# Patient Record
Sex: FEMALE | Employment: OTHER | URBAN - METROPOLITAN AREA
[De-identification: names, ages, dates, MRNs, and addresses within clinical notes are randomized per-mention and may not be internally consistent; named-entity substitution may affect disease eponyms.]

---

## 2017-01-18 ENCOUNTER — OFFICE VISIT (OUTPATIENT)
Dept: HEMATOLOGY | Age: 57
End: 2017-01-18

## 2017-01-18 VITALS
RESPIRATION RATE: 16 BRPM | OXYGEN SATURATION: 98 % | HEART RATE: 91 BPM | SYSTOLIC BLOOD PRESSURE: 142 MMHG | TEMPERATURE: 98.5 F | BODY MASS INDEX: 27.13 KG/M2 | DIASTOLIC BLOOD PRESSURE: 81 MMHG | WEIGHT: 179 LBS | HEIGHT: 68 IN

## 2017-01-18 DIAGNOSIS — K75.81 NONALCOHOLIC STEATOHEPATITIS (NASH): Primary | ICD-10-CM

## 2017-01-18 NOTE — PROGRESS NOTES
93 [facility], TAYLER Donato PA-C Winton Abraham, MD, [address], MD Rissa Onofre NP Curly Kast, NP        [address], [address] [facility], [address]     [phone]     FAX: [address] [place] [address], [address] [place] [address], [address]     [phone]     FAX: [phone]       Patient Care Team:  Jose Hernandez MD as PCP - General (Internal Medicine)      Problem List  Date Reviewed: 12/31/2016          Codes Class Noted    Borderline high serum cholesterol ICD-10-CM: E78.9  ICD-9-CM: 272.9  12/14/2016        Menopausal and postmenopausal disorder ICD-10-CM: N95.9  ICD-9-CM: 627.9  12/14/2016        Nonalcoholic steatohepatitis (CARMONA) ICD-10-CM: K75.81  ICD-9-CM: 571.8  12/14/2016    Overview Signed 12/14/2016  2:24 PM by Maria T Billy     Overview:   Dr. Oz Lindquist. biopsy. Pelvic pain ICD-10-CM: R10.2  ICD-9-CM: [id number]  12/14/2016        Sensation of pressure in bladder area ICD-10-CM: R39.89  ICD-9-CM: 596.89  12/14/2016        Urinary frequency ICD-10-CM: R35.0  ICD-9-CM: 788.41  12/14/2016        Incomplete bladder emptying ICD-10-CM: R33.9  ICD-9-CM: 788.21  12/14/2016        Microscopic hematuria ICD-10-CM: R31.29  ICD-9-CM: 599.72  12/8/2016        Hypercholesterolemia ICD-10-CM: E78.00  ICD-9-CM: 272.0  8/18/2016        S/P appendectomy ICD-10-CM: Z90.49  ICD-9-CM: V45.89  8/18/2016              Lynn Lange returns to the [address] for management CARMONA. The active problem list, all pertinent past medical history, liver histology, medications, radiologic findings and laboratory findings related to the liver disorder were reviewed with the patient. The patient is a 49 y.o.   female who was first noted to have abnormalities in liver transaminases in 2/2016. Serologic evaluation for markers of chronic liver disease were all negative. The most recent Ultrasound of the liver was performed in 9/2016. The results of the imaging suggested chronic liver disease or fatty liver disease. Assessment of liver fibrosis was performed with sheer wave elastogrphy at THE [facility] in 9/2016. The result was 5.5 kPa which correlates with stage 1 portal fibrosis. The patient underwent a liver biopsy in 10/2016. This demonstrates CARMONA with portal fibrosis. The most recent laboratory studies indicate that the liver transaminases are elevated, ALP is normal, tests of hepatic synthetic and metabolic function are normal,     The patient has no symptoms which could be attributed to the liver disorder. The patient completes all daily activities without any functional limitations. The patient has not experienced fatigue, pain in the right side over the liver,       ALLERGIES  Allergies   Allergen Reactions    Stadol [Butorphanol Tartrate] Hives    Sulfa (Sulfonamide Antibiotics) Hives       MEDICATIONS  Current Outpatient Prescriptions   Medication Sig    ezetimibe (ZETIA) 10 mg tablet Take  by mouth.  ibuprofen (MOTRIN) 200 mg tablet 200 mg.  LORazepam (ATIVAN) 1 mg tablet TAKE 1/2 TO 1 TABLET BY MOUTH 3 TIMES AS NEEDED FOR ANXIETY     No current facility-administered medications for this visit. SYSTEM REVIEW NOT RELATED TO LIVER DISEASE OR REVIEWED ABOVE:  Constitution systems: Negative for fever, chills, weight gain, weight loss. Eyes: Negative for visual changes. ENT: Negative for sore throat, painful swallowing. Respiratory: Negative for cough, hemoptysis, SOB. Cardiology: Negative for chest pain, palpitations. GI:  Negative for constipation or diarrhea. : Negative for urinary frequency, dysuria, hematuria, nocturia. Skin: Negative for rash. Hematology: Negative for easy bruising, blood clots.     Musculo-skelatal: Right flank pain. Neurologic: Negative for headaches, dizziness, vertigo, memory problems not related to HE. Psychology: Negative for anxiety, depression. FAMILY HISTORY:  The father  of GB rupture. The mother  of CHF. There is no family history of liver disease. SOCIAL HISTORY:  The patient is . The patient has 2 children,   The patient has never used tobacco products. The patient consumes 2-3 alcoholic beverages per weekend. The patient currently works full time [employer]. PHYSICAL EXAMINATION:  Visit Vitals    /81 (BP 1 Location: Left arm, BP Patient Position: Sitting)    Pulse 91    Temp 98.5 °F (36.9 °C) (Tympanic)    Resp 16    Ht 5' 8\" (1.727 m)    Wt 179 lb (81.2 kg)    SpO2 98%    BMI 27.22 kg/m2     General: No acute distress. Eyes: Sclera anicteric. ENT: No oral lesions. Thyroid normal.  Nodes: No adenopathy. Skin: No spider angiomata. No jaundice. No palmar erythema. Respiratory: Lungs clear to auscultation. Cardiovascular: Regular heart rate. No murmurs. No JVD. Abdomen: Soft non-tender. Liver size normal to percussion/palpation. Spleen not palpable. No obvious ascites. Extremities: No edema. No muscle wasting. No gross arthritic changes. Neurologic: Alert and oriented. Cranial nerves grossly intact. No asterixis.     LABORATORY STUDIES:  [facility] Ref Rng 2016   WBC 4.6 - 13.2 K/uL 7.3   ANC 1.8 - 8.0 K/UL 3.8   HGB 12.0 - 16.0 g/dL 13.8    - 420 K/uL 229   INR 0.8 - 1.2   0.9   AST 15 - 37 U/L 34   ALT 13 - 56 U/L 65 (H)   Alk Phos 45 - 117 U/L 121 (H)   Bili, Total 0.2 - 1.0 MG/DL 0.4   Bili, Direct 0.0 - 0.2 MG/DL 0.1   Albumin 3.4 - 5.0 g/dL 4.2   BUN 7.0 - 18 MG/DL 12   Creat 0.6 - 1.3 MG/DL 0.89   Na 136 - 145 mmol/L 143   K 3.5 - 5.5 mmol/L 3.8   Cl 100 - 108 mmol/L 105   CO2 21 - 32 mmol/L 26   Glucose 74 - 99 mg/dL 85       SEROLOGIES:  Serologies Latest Ref Rng 8/18/2016   Hep A Ab, Total NEGATIVE   NEGATIVE   Hep B Surface Ag <1.00 Index <0.10   Hep B Surface Ag Interp NEG   NEGATIVE   Hep B Core Ab, Total NEGATIVE   NEGATIVE   Hep B Surface Ab >10.0 mIU/mL <3.10 (L)   Hep B Surface Ab Interp POS   NEGATIVE (A)   Hep C Ab 0.0 - 0.9 s/co ratio <0.1   Ferritin 8 - 388 NG/ML 77   Iron % Saturation % 26   JOCY, IFA  NEGATIVE   ASMCA 0 - 19 Units 15   Ceruloplasmin 19.0 - 39.0 mg/dL 26.1   Alpha-1 antitrypsin level 90 - 200 mg/dL 91     LIVER HISTOLOGY:  9/2016. Ultrasound sheer wave elastography was performed at THE [facility]. E Range: 4.60-6.55 kPa. E Mean: 5.52 kPa. E Median: 5.66 kPa. E Std: 0.62 kPa  The results suggested a fibrosis level of F1.  10/2016. Slides reviewed by MLS. CARMONA. 66-75% macro and micovesicular steatosis. Mild ballooning. Mild inflammation. Portal fibrosis. DINORA score 5 (311). ENDOSCOPIC PROCEDURES:  Not available or performed    RADIOLOGY:  4/2016. Ultrasound of liver. Echogenic consistent with chronic liver disease or fatty lvier. No liver mass lesions. No dilated bile ducts. No ascites. 9/2016. Ultrasound of liver. Echogenic consistent with chronic liver disease. No liver mass lesions. No dilated bile ducts. No ascites. OTHER TESTING:  Not available or performed    ASSESSMENT AND PLAN:  CARMONA with portal fibrosis. Liver transaminases are elevated. Alkaline phosphate is normal.  Liver function is normal.  The platelet count is normal.      The patient was counseled regarding diet and exercise to achieve weight loss. The best diet for patients with fatty liver is one very low in carbohydrates and enriched with protein such as an Noelle's program.      The patient was told to to consume any food products and drinks containing fructose as this enhances hepatic fat synthesis. The patient would be eligible for participation in clinical trial for treatment of CARMONA.   She would like to enrol in a clinical trial.  She will return to the office in 1 week for this. We will continue to monitor the patient at periodic intervals. If weight loss is successful the fat will resolve from the liver and liver enzymes should return to the normal range. We would then repeat an ultrasound to see if this also returns to normal.  If liver enzymes do not return to normal with weight reduction additional evaluation may be necessary over time. The patient was directed to continue all current medications at the current dosages. There are no contraindications for the patient to take any medications that are necessary for treatment of other medical issues including medications for diabetes mellitus and hypercholesterolemia. The patient was counseled regarding alcohol consumption and that this could contribute to fatty liver disease. Vaccination for viral hepatitis A and B is recommended since the patient has no serologic evidence of previous exposure or vaccination with immunity. All of the above issues were discussed with the patient. All questions were answered. The patient expressed a clear understanding of the above. [facility] [place] [address] in 1 week to enter a clinical trial for treatment of CARMONA.     Jean Michel MD  Liver [place] of [address] [place] [address], [address] [place] [address] Jeri Treadwell, [address]  [phone]

## 2017-01-18 NOTE — MR AVS SNAPSHOT
Visit Information Date & Time Provider Department Dept. Phone Encounter #  
 1/18/2017  3:00 PM Ned Greenwood MD Via [address] [id number] Upcoming Health Maintenance Date Due DTaP/Tdap/Td series (1 - Tdap) 11/27/1983 PAP AKA CERVICAL CYTOLOGY 11/27/1983 BREAST CANCER SCRN MAMMOGRAM 11/27/2012 FOBT Q 1 YEAR AGE 50-75 11/27/2012 INFLUENZA AGE 9 TO ADULT 8/1/2016 Allergies as of 1/18/2017  Review Complete On: 1/18/2017 By: Daly Neely Severity Noted Reaction Type Reactions Stadol [Butorphanol Tartrate]  08/18/2016    Hives Sulfa (Sulfonamide Antibiotics)  01/18/2017    Hives Current Immunizations  Never Reviewed No immunizations on file. Not reviewed this visit Vitals BP Pulse Temp Resp Height(growth percentile) 142/81 (BP 1 Location: Left arm, BP Patient Position: Sitting) 91 98.5 °F (36.9 °C) (Tympanic) 16 5' 8\" (1.727 m) Weight(growth percentile) SpO2 BMI OB Status Smoking Status 179 lb (81.2 kg) 98% 27.22 kg/m2 Menopause Never Smoker Vitals History BMI and BSA Data Body Mass Index Body Surface Area  
 27.22 kg/m 2 1.97 m 2 Preferred Pharmacy Pharmacy Name Phone [id number] [place], [address] [place] [address] AT [address] [place] [address] [phone] Your Updated Medication List  
  
   
This list is accurate as of: 1/18/17  3:38 PM.  Always use your most recent med list.  
  
  
  
  
 ibuprofen 200 mg tablet Commonly known as:  MOTRIN  
200 mg. LORazepam 1 mg tablet Commonly known as:  ATIVAN  
TAKE 1/2 TO 1 TABLET BY MOUTH 3 TIMES AS NEEDED FOR ANXIETY  
  
 ZETIA 10 mg tablet Generic drug:  ezetimibe Take  by mouth. Introducing [facility] & HEALTH SERVICES! Dear Regulo Bean: Thank you for requesting a [employer] account.   Our records indicate that you already have an active Shizzlr account. You can access your account anytime at [email or web address] Did you know that you can access your hospital and ER discharge instructions at any time in Shizzlr? You can also review all of your test results from your hospital stay or ER visit. Additional Information If you have questions, please visit the Frequently Asked Questions section of the Shizzlr website at [email or web address] Remember, Shizzlr is NOT to be used for urgent needs. For medical emergencies, dial 911. Now available from your iPhone and Android! Please provide this summary of care documentation to your next provider. Your primary care clinician is listed as Julián Skaggs. If you have any questions after today's visit, please call [phone].

## 2017-03-14 ENCOUNTER — RESEARCH ENCOUNTER (OUTPATIENT)
Dept: HEMATOLOGY | Age: 57
End: 2017-03-14

## 2017-03-14 ENCOUNTER — HOSPITAL ENCOUNTER (OUTPATIENT)
Dept: LAB | Age: 57
Discharge: HOME | End: 2017-03-14

## 2017-03-14 ENCOUNTER — HOSPITAL ENCOUNTER (OUTPATIENT)
Dept: LAB | Age: 57
Discharge: HOME | End: 2017-03-14
Payer: OTHER GOVERNMENT

## 2017-03-14 DIAGNOSIS — Z00.6 EXAMINATION OF PARTICIPANT IN CLINICAL TRIAL: ICD-10-CM

## 2017-03-14 DIAGNOSIS — Z00.6 EXAMINATION OF PARTICIPANT IN CLINICAL TRIAL: Primary | ICD-10-CM

## 2017-03-14 LAB
ALBUMIN SERPL BCP-MCNC: 3.9 G/DL (ref 3.4–5)
ALBUMIN/GLOB SERPL: 1.1 {RATIO} (ref 0.8–1.7)
ALP SERPL-CCNC: 110 U/L (ref 45–117)
ALT SERPL-CCNC: 44 U/L (ref 13–56)
ANION GAP BLD CALC-SCNC: 7 MMOL/L (ref 3–18)
APTT PPP: 30.6 SEC (ref 23–36.4)
AST SERPL W P-5'-P-CCNC: 23 U/L (ref 15–37)
BASOPHILS # BLD AUTO: 0 K/UL (ref 0–0.06)
BASOPHILS # BLD: 1 % (ref 0–2)
BILIRUB SERPL-MCNC: 0.5 MG/DL (ref 0.2–1)
BUN SERPL-MCNC: 12 MG/DL (ref 7–18)
BUN/CREAT SERPL: 13 (ref 12–20)
CALCIUM SERPL-MCNC: 9.1 MG/DL (ref 8.5–10.1)
CHLORIDE SERPL-SCNC: 106 MMOL/L (ref 100–108)
CHOLEST SERPL-MCNC: 219 MG/DL
CO2 SERPL-SCNC: 30 MMOL/L (ref 21–32)
CREAT SERPL-MCNC: 0.94 MG/DL (ref 0.6–1.3)
CRP SERPL-MCNC: 0.5 MG/DL (ref 0–0.3)
DIFFERENTIAL METHOD BLD: ABNORMAL
EOSINOPHIL # BLD: 0.1 K/UL (ref 0–0.4)
EOSINOPHIL NFR BLD: 2 % (ref 0–5)
ERYTHROCYTE [DISTWIDTH] IN BLOOD BY AUTOMATED COUNT: 12.3 % (ref 11.6–14.5)
EST. AVERAGE GLUCOSE BLD GHB EST-MCNC: 108 MG/DL
FIBRINOGEN PPP-MCNC: 310 MG/DL (ref 210–451)
GLOBULIN SER CALC-MCNC: 3.5 G/DL (ref 2–4)
GLUCOSE SERPL-MCNC: 96 MG/DL (ref 74–99)
HBA1C MFR BLD: 5.4 % (ref 4.5–5.6)
HCT VFR BLD AUTO: 40.1 % (ref 35–45)
HDLC SERPL-MCNC: 72 MG/DL (ref 40–60)
HDLC SERPL: 3 {RATIO} (ref 0–5)
HGB BLD-MCNC: 13.1 G/DL (ref 12–16)
INR PPP: 1 (ref 0.8–1.2)
LDLC SERPL CALC-MCNC: 134.4 MG/DL (ref 0–100)
LIPID PROFILE,FLP: ABNORMAL
LYMPHOCYTES # BLD AUTO: 25 % (ref 21–52)
LYMPHOCYTES # BLD: 1.6 K/UL (ref 0.9–3.6)
MAGNESIUM SERPL-MCNC: 2 MG/DL (ref 1.8–2.4)
MCH RBC QN AUTO: 29.6 PG (ref 24–34)
MCHC RBC AUTO-ENTMCNC: 32.7 G/DL (ref 31–37)
MCV RBC AUTO: 90.5 FL (ref 74–97)
MONOCYTES # BLD: 0.7 K/UL (ref 0.05–1.2)
MONOCYTES NFR BLD AUTO: 11 % (ref 3–10)
NEUTS SEG # BLD: 4 K/UL (ref 1.8–8)
NEUTS SEG NFR BLD AUTO: 61 % (ref 40–73)
PHOSPHATE SERPL-MCNC: 3.4 MG/DL (ref 2.5–4.9)
PLATELET # BLD AUTO: 232 K/UL (ref 135–420)
PMV BLD AUTO: 10 FL (ref 9.2–11.8)
POTASSIUM SERPL-SCNC: 4.1 MMOL/L (ref 3.5–5.5)
PROT SERPL-MCNC: 7.4 G/DL (ref 6.4–8.2)
PROTHROMBIN TIME: 12.4 SEC (ref 11.5–15.2)
RBC # BLD AUTO: 4.43 M/UL (ref 4.2–5.3)
SODIUM SERPL-SCNC: 143 MMOL/L (ref 136–145)
T4 FREE SERPL-MCNC: 1 NG/DL (ref 0.7–1.5)
TRIGL SERPL-MCNC: 63 MG/DL (ref ?–150)
TSH SERPL DL<=0.05 MIU/L-ACNC: 0.87 UIU/ML (ref 0.36–3.74)
VLDLC SERPL CALC-MCNC: 12.6 MG/DL
WBC # BLD AUTO: 6.4 K/UL (ref 4.6–13.2)

## 2017-03-14 PROCEDURE — 80053 COMPREHEN METABOLIC PANEL: CPT | Performed by: NURSE PRACTITIONER

## 2017-03-14 PROCEDURE — 83036 HEMOGLOBIN GLYCOSYLATED A1C: CPT | Performed by: NURSE PRACTITIONER

## 2017-03-14 PROCEDURE — 86140 C-REACTIVE PROTEIN: CPT | Performed by: NURSE PRACTITIONER

## 2017-03-14 PROCEDURE — 85384 FIBRINOGEN ACTIVITY: CPT | Performed by: NURSE PRACTITIONER

## 2017-03-14 PROCEDURE — 85610 PROTHROMBIN TIME: CPT | Performed by: NURSE PRACTITIONER

## 2017-03-14 PROCEDURE — 85025 COMPLETE CBC W/AUTO DIFF WBC: CPT | Performed by: NURSE PRACTITIONER

## 2017-03-14 PROCEDURE — 84100 ASSAY OF PHOSPHORUS: CPT | Performed by: NURSE PRACTITIONER

## 2017-03-14 PROCEDURE — 84439 ASSAY OF FREE THYROXINE: CPT | Performed by: NURSE PRACTITIONER

## 2017-03-14 PROCEDURE — 85730 THROMBOPLASTIN TIME PARTIAL: CPT | Performed by: NURSE PRACTITIONER

## 2017-03-14 PROCEDURE — 80061 LIPID PANEL: CPT | Performed by: NURSE PRACTITIONER

## 2017-03-14 PROCEDURE — 99000 SPECIMEN HANDLING OFFICE-LAB: CPT | Performed by: INTERNAL MEDICINE

## 2017-03-14 PROCEDURE — 83735 ASSAY OF MAGNESIUM: CPT | Performed by: NURSE PRACTITIONER

## 2017-05-31 ENCOUNTER — HOSPITAL ENCOUNTER (OUTPATIENT)
Dept: LAB | Age: 57
Discharge: HOME | End: 2017-05-31
Payer: OTHER GOVERNMENT

## 2017-05-31 ENCOUNTER — OFFICE VISIT (OUTPATIENT)
Dept: HEMATOLOGY | Age: 57
End: 2017-05-31

## 2017-05-31 VITALS
HEART RATE: 70 BPM | OXYGEN SATURATION: 98 % | WEIGHT: 161 LBS | SYSTOLIC BLOOD PRESSURE: 140 MMHG | BODY MASS INDEX: 24.4 KG/M2 | DIASTOLIC BLOOD PRESSURE: 79 MMHG | RESPIRATION RATE: 16 BRPM | HEIGHT: 68 IN | TEMPERATURE: 97.9 F

## 2017-05-31 DIAGNOSIS — K75.81 NASH (NONALCOHOLIC STEATOHEPATITIS): ICD-10-CM

## 2017-05-31 DIAGNOSIS — K75.81 NASH (NONALCOHOLIC STEATOHEPATITIS): Primary | ICD-10-CM

## 2017-05-31 LAB
ALBUMIN SERPL BCP-MCNC: 3.9 G/DL (ref 3.4–5)
ALBUMIN/GLOB SERPL: 1.2 {RATIO} (ref 0.8–1.7)
ALP SERPL-CCNC: 116 U/L (ref 45–117)
ALT SERPL-CCNC: 41 U/L (ref 13–56)
AST SERPL W P-5'-P-CCNC: 28 U/L (ref 15–37)
BILIRUB DIRECT SERPL-MCNC: 0.1 MG/DL (ref 0–0.2)
BILIRUB SERPL-MCNC: 0.5 MG/DL (ref 0.2–1)
GLOBULIN SER CALC-MCNC: 3.3 G/DL (ref 2–4)
PROT SERPL-MCNC: 7.2 G/DL (ref 6.4–8.2)

## 2017-05-31 PROCEDURE — 36415 COLL VENOUS BLD VENIPUNCTURE: CPT | Performed by: INTERNAL MEDICINE

## 2017-05-31 PROCEDURE — 80076 HEPATIC FUNCTION PANEL: CPT | Performed by: INTERNAL MEDICINE

## 2017-05-31 NOTE — MR AVS SNAPSHOT
Visit Information Date & Time Provider Department Dept. Phone Encounter #  
 5/31/2017  4:00 PM Faye Vasquez MD Liver [place] [facility] [place] [phone] [id number] Follow-up Instructions Return in about 1 year (around 5/31/2018) for MLS. Upcoming Health Maintenance Date Due DTaP/Tdap/Td series (1 - Tdap) 11/27/1983 PAP AKA CERVICAL CYTOLOGY 11/27/1983 BREAST CANCER SCRN MAMMOGRAM 11/27/2012 FOBT Q 1 YEAR AGE 50-75 11/27/2012 INFLUENZA AGE 9 TO ADULT 8/1/2017 Allergies as of 5/31/2017  Review Complete On: 5/31/2017 By: Inell Cheeks Severity Noted Reaction Type Reactions Stadol [Butorphanol Tartrate]  08/18/2016    Hives Sulfa (Sulfonamide Antibiotics)  01/18/2017    Hives Current Immunizations  Never Reviewed No immunizations on file. Not reviewed this visit You Were Diagnosed With   
  
 Codes Comments CARMONA (nonalcoholic steatohepatitis)    -  Primary ICD-10-CM: V50.00 ICD-9-CM: 571.8 Vitals BP Pulse Temp Resp Height(growth percentile) 140/79 (BP 1 Location: Left arm, BP Patient Position: Sitting) 70 97.9 °F (36.6 °C) (Tympanic) 16 5' 8\" (1.727 m) Weight(growth percentile) SpO2 BMI OB Status Smoking Status 161 lb (73 kg) 98% 24.48 kg/m2 Menopause Never Smoker Vitals History BMI and BSA Data Body Mass Index Body Surface Area  
 24.48 kg/m 2 1.87 m 2 Preferred Pharmacy Pharmacy Name Phone [facility] [id number] - [place], [address] [facility] AT [address] [place] [address] [phone] Your Updated Medication List  
  
   
This list is accurate as of: 5/31/17  4:16 PM.  Always use your most recent med list.  
  
  
  
  
 ibuprofen 200 mg tablet Commonly known as:  MOTRIN  
200 mg. LORazepam 1 mg tablet Commonly known as:  ATIVAN  
TAKE 1/2 TO 1 TABLET BY MOUTH 3 TIMES AS NEEDED FOR ANXIETY  
  
 ZETIA 10 mg tablet Generic drug:  ezetimibe Take  by mouth. Follow-up Instructions Return in about 1 year (around 5/31/2018) for MLS. To-Do List   
 05/31/2017 Lab:  HEPATIC FUNCTION PANEL   
  
 05/31/2018 Imaging:  US ABD LTD W ELASTOGRAPHY Introducing [facility] & HEALTH SERVICES! Dear Bryan Nichols: Thank you for requesting a [employer] account. Our records indicate that you already have an active Sixth[employer] account. You can access your account anytime at [email or web address] Did you know that you can access your hospital and ER discharge instructions at any time in SixthEye? You can also review all of your test results from your hospital stay or ER visit. Additional Information If you have questions, please visit the Frequently Asked Questions section of the SixthEye website at [email or web address] Remember, SixthEye is NOT to be used for urgent needs. For medical emergencies, dial 911. Now available from your iPhone and Android! Please provide this summary of care documentation to your next provider. Your primary care clinician is listed as Ti Leal. If you have any questions after today's visit, please call [phone].

## 2017-05-31 NOTE — PROGRESS NOTES
93 Og Tran MD, TAYLER Amezcua PA-C Ramona Chihuahua, MD, [address], MD Rissa Lantigua NP Cathaleen Borer, NP [facility]     217 [facility], [address] [address]     [phone]     FAX: [address] [place] [address], [address] [facility], [address] [phone]     FAX: [phone]       Patient Care Team:  Dimitris Lugo MD as PCP - General (Internal Medicine)      Problem List  Date Reviewed: 1/18/2017          Codes Class Noted    Borderline high serum cholesterol ICD-10-CM: E78.9  ICD-9-CM: 272.9  12/14/2016        Menopausal and postmenopausal disorder ICD-10-CM: N95.9  ICD-9-CM: 627.9  12/14/2016        Nonalcoholic steatohepatitis (CARMONA) ICD-10-CM: K75.81  ICD-9-CM: 571.8  12/14/2016    Overview Signed 12/14/2016  2:24 PM by Timoteo Dunn     Overview:   Dr. Johnson. biopsy. Pelvic pain ICD-10-CM: R10.2  ICD-9-CM: [id number]  12/14/2016        Sensation of pressure in bladder area ICD-10-CM: R39.89  ICD-9-CM: 596.89  12/14/2016        Urinary frequency ICD-10-CM: R35.0  ICD-9-CM: 788.41  12/14/2016        Incomplete bladder emptying ICD-10-CM: R33.9  ICD-9-CM: 788.21  12/14/2016        Microscopic hematuria ICD-10-CM: R31.29  ICD-9-CM: 599.72  12/8/2016        Hypercholesterolemia ICD-10-CM: E78.00  ICD-9-CM: 272.0  8/18/2016        S/P appendectomy ICD-10-CM: Z90.49  ICD-9-CM: V45.89  8/18/2016              Steff Cullen returns to the [address] for management CARMONA. The active problem list, all pertinent past medical history, liver histology, medications, radiologic findings and laboratory findings related to the liver disorder were reviewed with the patient. The patient is a 49 y.o.   female who was noted to have abnormalities in liver transaminases in 2/2016. Serologic evaluation for markers of chronic liver disease were all negative. The most recent Ultrasound of the liver was performed in 9/2016. The results of the imaging suggested chronic liver disease or fatty liver disease. Assessment of liver fibrosis was performed with sheer wave elastogrphy at THE [facility] in 9/2016. The result was 5.5 kPa which correlates with stage 1 portal fibrosis. The patient underwent a liver biopsy in 10/2016. This demonstrates NAFLD with portal fibrosis. The patient has no symptoms which could be attributed to the liver disorder. The patient completes all daily activities without any functional limitations. The patient has not experienced fatigue, pain in the right side over the liver,       ALLERGIES  Allergies   Allergen Reactions    Stadol [Butorphanol Tartrate] Hives    Sulfa (Sulfonamide Antibiotics) Hives       MEDICATIONS  Current Outpatient Prescriptions   Medication Sig    LORazepam (ATIVAN) 1 mg tablet TAKE 1/2 TO 1 TABLET BY MOUTH 3 TIMES AS NEEDED FOR ANXIETY    ibuprofen (MOTRIN) 200 mg tablet 200 mg.    ezetimibe (ZETIA) 10 mg tablet Take  by mouth. No current facility-administered medications for this visit. SYSTEM REVIEW NOT RELATED TO LIVER DISEASE OR REVIEWED ABOVE:  Constitution systems: Negative for fever, chills, weight gain, weight loss. Eyes: Negative for visual changes. ENT: Negative for sore throat, painful swallowing. Respiratory: Negative for cough, hemoptysis, SOB. Cardiology: Negative for chest pain, palpitations. GI:  Negative for constipation or diarrhea. : Negative for urinary frequency, dysuria, hematuria, nocturia. Skin: Negative for rash. Hematology: Negative for easy bruising, blood clots. Musculo-skelatal: Right flank pain. Neurologic: Negative for headaches, dizziness, vertigo, memory problems not related to HE. Psychology: Negative for anxiety, depression. FAMILY HISTORY:  The father  of GB rupture. The mother  of CHF. There is no family history of liver disease. SOCIAL HISTORY:  The patient is . The patient has 2 children,   The patient has never used tobacco products. The patient consumes 2-3 alcoholic beverages per weekend. The patient currently works full time [employer]. PHYSICAL EXAMINATION:  /79 (BP 1 Location: Left arm, BP Patient Position: Sitting)  Pulse 70  Temp 97.9 °F (36.6 °C) (Tympanic)   Resp 16  Ht 5' 8\" (1.727 m)  Wt 161 lb (73 kg)  SpO2 98%  BMI 24.48 kg/m2  General: No acute distress. Eyes: Sclera anicteric. ENT: No oral lesions. Thyroid normal.  Nodes: No adenopathy. Skin: No spider angiomata. No jaundice. No palmar erythema. Respiratory: Lungs clear to auscultation. Cardiovascular: Regular heart rate. No murmurs. No JVD. Abdomen: Soft non-tender. Liver size normal to percussion/palpation. Spleen not palpable. No obvious ascites. Extremities: No edema. No muscle wasting. No gross arthritic changes. Neurologic: Alert and oriented. Cranial nerves grossly intact. No asterixis.     LABORATORY STUDIES:  Liver [place] of [address] [place] [address] & Units 2017 3/14/2017 2016   WBC 4.6 - 13.2 K/uL  6.4 7.3   ANC 1.8 - 8.0 K/UL  4.0 3.8   HGB 12.0 - 16.0 g/dL  13.1 13.8    - 420 K/uL  232 229   INR 0.8 - 1.2    1.0 0.9   AST 15 - 37 U/L 28 23 34   ALT 13 - 56 U/L 41 44 65 (H)   Alk Phos 45 - 117 U/L 116 110 121 (H)   Bili, Total 0.2 - 1.0 MG/DL 0.5 0.5 0.4   Bili, Direct 0.0 - 0.2 MG/DL 0.1  0.1   Albumin 3.4 - 5.0 g/dL 3.9 3.9 4.2   BUN 7.0 - 18 MG/DL  12 12   Creat 0.6 - 1.3 MG/DL  0.94 0.89   Na 136 - 145 mmol/L  143 143   K 3.5 - 5.5 mmol/L  4.1 3.8   Cl 100 - 108 mmol/L  106 105   CO2 21 - 32 mmol/L  30 26   Glucose 74 - 99 mg/dL  96 85   Magnesium 1.8 - 2.4 mg/dL  2.0          SEROLOGIES:  Serologies Latest Ref Rng 2016   Hep A Ab, Total NEGATIVE   NEGATIVE   Hep B Surface Ag <1.00 Index <0.10   Hep B Surface Ag Interp NEG   NEGATIVE   Hep B Core Ab, Total NEGATIVE   NEGATIVE   Hep B Surface Ab >10.0 mIU/mL <3.10 (L)   Hep B Surface Ab Interp POS   NEGATIVE (A)   Hep C Ab 0.0 - 0.9 s/co ratio <0.1   Ferritin 8 - 388 NG/ML 77   Iron % Saturation % 26   JOCY, IFA  NEGATIVE   ASMCA 0 - 19 Units 15   Ceruloplasmin 19.0 - 39.0 mg/dL 26.1   Alpha-1 antitrypsin level 90 - 200 mg/dL 91     LIVER HISTOLOGY:  9/2016. Ultrasound sheer wave elastography was performed at THE [facility]. E Range: 4.60-6.55 kPa. E Mean: 5.52 kPa. E Median: 5.66 kPa. E Std: 0.62 kPa  The results suggested a fibrosis level of F1.  10/2016. Slides reviewed by MLS. CARMONA.  33-66% macro and micovesicular steatosis. Mild ballooning. Mild inflammation. Portal fibrosis. DINORA score 5 (211). ENDOSCOPIC PROCEDURES:  Not available or performed    RADIOLOGY:  4/2016. Ultrasound of liver. Echogenic consistent with chronic liver disease or fatty lvier. No liver mass lesions. No dilated bile ducts. No ascites. 9/2016. Ultrasound of liver. Echogenic consistent with chronic liver disease. No liver mass lesions. No dilated bile ducts. No ascites. OTHER TESTING:  Not available or performed    ASSESSMENT AND PLAN:  NAFLD with portal fibrosis. The patient was counseled regarding diet and exercise to achieve weight loss. The best diet for patients with fatty liver is one very low in carbohydrates and enriched with protein such as an Noelle's program.      The patient was told to to consume any food products and drinks containing fructose as this enhances hepatic fat synthesis. The patient has lost 25 pounds since we first saw her in 12/2016. Liver transaminases are now normal.  Alkaline phosphate is normal.  Liver function is normal.  The platelet count is normal.      We will continue to monitor the patient at periodic intervals.   If weight loss is successful the fat will resolve from the liver and liver enzymes should return to the normal range. We would then repeat an ultrasound to see if this also returns to normal.  If liver enzymes do not return to normal with weight reduction additional evaluation may be necessary over time. The patient was directed to continue all current medications at the current dosages. There are no contraindications for the patient to take any medications that are necessary for treatment of other medical issues including medications for diabetes mellitus and hypercholesterolemia. The patient was counseled regarding alcohol consumption and that this could contribute to fatty liver disease. Vaccination for viral hepatitis A and B is recommended since the patient has no serologic evidence of previous exposure or vaccination with immunity. All of the above issues were discussed with the patient. All questions were answered. The patient expressed a clear understanding of the above. [facility] 87 in 12 months. If she has normal liver enzymes at the next office visit then no further follow-up is required.       Farrel Nageotte, MD  Liver [place] of [address] [place] [address], [address] [place] [address] Jeri Treadwell, [address]  [phone]

## 2018-05-23 ENCOUNTER — HOSPITAL ENCOUNTER (OUTPATIENT)
Dept: ULTRASOUND IMAGING | Age: 58
Discharge: HOME | End: 2018-05-23
Attending: INTERNAL MEDICINE
Payer: OTHER GOVERNMENT

## 2018-05-23 DIAGNOSIS — K75.81 NASH (NONALCOHOLIC STEATOHEPATITIS): ICD-10-CM

## 2018-05-23 PROCEDURE — 0346T US ABD LTD W ELASTOGRAPHY: CPT

## 2018-05-30 ENCOUNTER — OFFICE VISIT (OUTPATIENT)
Dept: HEMATOLOGY | Age: 58
End: 2018-05-30

## 2018-05-30 VITALS
WEIGHT: 163 LBS | BODY MASS INDEX: 25.58 KG/M2 | HEART RATE: 66 BPM | RESPIRATION RATE: 18 BRPM | DIASTOLIC BLOOD PRESSURE: 76 MMHG | SYSTOLIC BLOOD PRESSURE: 132 MMHG | TEMPERATURE: 97.8 F | OXYGEN SATURATION: 98 % | HEIGHT: 67 IN

## 2018-05-30 DIAGNOSIS — K75.81 NONALCOHOLIC STEATOHEPATITIS (NASH): ICD-10-CM

## 2018-05-30 RX ORDER — ESCITALOPRAM OXALATE 20 MG/1
20 TABLET ORAL
COMMUNITY
Start: 2017-06-03

## 2018-05-30 NOTE — PROGRESS NOTES
70 Dylan Montejo MD, [place], [facility], [place]       Javon Vitale, JV Guerrero, [facility]   Parmjit Berry, TAYLER Briceno [facility] 136    at [address], [address] Nadia Hardwick  22.    [phone]    FAX: [address] [place] [address]    at [facility], [address] [place] [address], [address]    [phone]    FAX: [phone]         Patient Care Team:  Xenia Cabrera MD as PCP - General (Internal Medicine)      Problem List  Date Reviewed: 6/4/2017          Codes Class Noted    Borderline high serum cholesterol ICD-10-CM: E78.9  ICD-9-CM: 272.9  12/14/2016        Menopausal and postmenopausal disorder ICD-10-CM: N95.9  ICD-9-CM: 627.9  12/14/2016        Nonalcoholic steatohepatitis (CARMONA) ICD-10-CM: K75.81  ICD-9-CM: 571.8  12/14/2016        Incomplete bladder emptying ICD-10-CM: R33.9  ICD-9-CM: 788.21  12/14/2016        Microscopic hematuria ICD-10-CM: R31.29  ICD-9-CM: 599.72  12/8/2016        Hypercholesterolemia ICD-10-CM: E78.00  ICD-9-CM: 272.0  8/18/2016        S/P appendectomy ICD-10-CM: Z90.49  ICD-9-CM: V45.89  8/18/2016              Zoey Vargas returns to the [address] for management CARMONA. The active problem list, all pertinent past medical history, liver histology, medications, radiologic findings and laboratory findings related to the liver disorder were reviewed with the patient. The patient is a 56 y.o.  female who was noted to have abnormalities in liver transaminases in 2/2016. The patient has no symptoms which could be attributed to the liver disorder. The patient completes all daily activities without any functional limitations.       The patient has not experienced fatigue, pain in the right side over the liver,     All of the above issues were discussed with the patient. All questions were answered. The patient expressed a clear understanding of the above. [address] in 4 months. ASSESSMENT AND PLAN:  NAFLD   The patient underwent a liver biopsy in 10/2016. This demonstrated NAFLD with portal fibrosis. Elastography was also consistent with portal fibrosis. The patient was counseled regarding diet and exercise to achieve weight loss. The best diet for patients with fatty liver is one very low in carbohydrates and enriched with protein such as an Noelle's program.    The patient was told to to consume any food products and drinks containing fructose as this enhances hepatic fat synthesis. The patient has lost 25 pounds since she was first seen in 12/2016. Liver transaminases are now normal.  Alkaline phosphate is normal.  Liver function is normal.  The platelet count is normal.    We will continue to monitor the patient at periodic intervals. If weight loss is successful the fat will resolve from the liver and liver enzymes should return to the normal range. We would then repeat an ultrasound to see if this also returns to normal.  If liver enzymes do not return to normal with weight reduction additional evaluation may be necessary over time. Treatment of other medical problems in patients with chronic liver disease  The patient was directed to continue all current medications at the current dosages. There are no contraindications for the patient to take any medications that are necessary for treatment of other medical issues. The patient can take oral diabetic agents for treatment of diabetes and statins for treatment of hypercholesterolemia. This will not impact the patient's current liver disease. The patient does not have cirrhosis. Normal doses of NSAIDs can be used for pain as needed.     Counseling for alcohol in patients with chronic liver disease  The patient was counseled regarding alcohol consumption and the effect of alcohol on chronic liver disease. The patient was reminded that alcohol can cause fatty liver. The patient has mild liver disease and can consume an occasional alcoholic beverage. Vaccinations   Vaccination for viral hepatitis A and B is recommended since the patient has no serologic evidence of previous exposure or vaccination with immunity. Routine vaccinations against other bacterial and viral agents can be performed as indicated. Annual flu vaccination should be administered if indicated. ALLERGIES  Allergies   Allergen Reactions    Stadol [Butorphanol Tartrate] Hives    Sulfa (Sulfonamide Antibiotics) Hives       MEDICATIONS  Current Outpatient Prescriptions   Medication Sig    escitalopram oxalate (LEXAPRO) 20 mg tablet 20 mg.    ibuprofen (MOTRIN) 200 mg tablet 200 mg.  LORazepam (ATIVAN) 1 mg tablet TAKE 1/2 TO 1 TABLET BY MOUTH 3 TIMES AS NEEDED FOR ANXIETY    ezetimibe (ZETIA) 10 mg tablet Take  by mouth. No current facility-administered medications for this visit. SYSTEM REVIEW NOT RELATED TO LIVER DISEASE OR REVIEWED ABOVE:  Constitution systems: Negative for fever, chills, weight gain, weight loss. Eyes: Negative for visual changes. ENT: Negative for sore throat, painful swallowing. Respiratory: Negative for cough, hemoptysis, SOB. Cardiology: Negative for chest pain, palpitations. GI:  Negative for constipation or diarrhea. : Negative for urinary frequency, dysuria, hematuria, nocturia. Skin: Negative for rash. Hematology: Negative for easy bruising, blood clots. Musculo-skelatal: Right flank pain. Neurologic: Negative for headaches, dizziness, vertigo, memory problems not related to HE. Psychology: Negative for anxiety, depression. FAMILY HISTORY:  The father  of GB rupture. The mother  of CHF.     There is no family history of liver disease. SOCIAL HISTORY:  The patient is . The patient has 2 children,   The patient has never used tobacco products. The patient consumes 2-3 alcoholic beverages per weekend. The patient currently works full time [employer]. PHYSICAL EXAMINATION:  /76 (BP 1 Location: Right arm, BP Patient Position: Sitting) Comment: manual  Pulse 66  Temp 97.8 °F (36.6 °C) (Tympanic)   Resp 18  Ht 5' 7\" (1.702 m)  Wt 163 lb (73.9 kg)  SpO2 98%  BMI 25.53 kg/m2  General: No acute distress. Eyes: Sclera anicteric. ENT: No oral lesions. Thyroid normal.  Nodes: No adenopathy. Skin: No spider angiomata. No jaundice. No palmar erythema. Respiratory: Lungs clear to auscultation. Cardiovascular: Regular heart rate. No murmurs. No JVD. Abdomen: Soft non-tender. Liver size normal to percussion/palpation. Spleen not palpable. No obvious ascites. Extremities: No edema. No muscle wasting. No gross arthritic changes. Neurologic: Alert and oriented. Cranial nerves grossly intact. No asterixis.     LABORATORY STUDIES:  Liver [place] of [address] Units 5/31/2017 3/14/2017   WBC 4.6 - 13.2 K/uL  6.4   ANC 1.8 - 8.0 K/UL  4.0   HGB 12.0 - 16.0 g/dL  13.1    - 420 K/uL  232   INR 0.8 - 1.2    1.0   AST 15 - 37 U/L 28 23   ALT 13 - 56 U/L 41 44   Alk Phos 45 - 117 U/L 116 110   Bili, Total 0.2 - 1.0 MG/DL 0.5 0.5   Bili, Direct 0.0 - 0.2 MG/DL 0.1    Albumin 3.4 - 5.0 g/dL 3.9 3.9   BUN 7.0 - 18 MG/DL  12   Creat 0.6 - 1.3 MG/DL  0.94   Na 136 - 145 mmol/L  143   K 3.5 - 5.5 mmol/L  4.1   Cl 100 - 108 mmol/L  106   CO2 21 - 32 mmol/L  30   Glucose 74 - 99 mg/dL  96   Magnesium 1.8 - 2.4 mg/dL  2.0       SEROLOGIES:  Serologies Latest Ref Rng 8/18/2016   Hep A Ab, Total NEGATIVE   NEGATIVE   Hep B Surface Ag <1.00 Index <0.10   Hep B Surface Ag Interp NEG   NEGATIVE   Hep B Core Ab, Total NEGATIVE   NEGATIVE   Hep B Surface Ab >10.0 mIU/mL <3.10 (L)   Hep B Surface Ab Interp POS   NEGATIVE (A)   Hep C Ab 0.0 - 0.9 s/co ratio <0.1   Ferritin 8 - 388 NG/ML 77   Iron % Saturation % 26   JOCY, IFA  NEGATIVE   ASMCA 0 - 19 Units 15   Ceruloplasmin 19.0 - 39.0 mg/dL 26.1   Alpha-1 antitrypsin level 90 - 200 mg/dL 91     LIVER HISTOLOGY:  9/2016. Ultrasound sheer wave elastography was performed at THE [facility]. E Range: 4.60-6.55 kPa. E Mean: 5.52 kPa. E Median: 5.66 kPa. E Std: 0.62 kPa  The results suggested a fibrosis level of F1.  10/2016. Slides reviewed by MLS. CARMONA.  33-66% macro and micovesicular steatosis. Mild ballooning. Mild inflammation. Portal fibrosis. DINORA score 5 (211). ENDOSCOPIC PROCEDURES:  Not available or performed    RADIOLOGY:  4/2016. Ultrasound of liver. Echogenic consistent with chronic liver disease or fatty lvier. No liver mass lesions. No dilated bile ducts. No ascites. 9/2016. Ultrasound of liver. Echogenic consistent with chronic liver disease. No liver mass lesions. No dilated bile ducts. No ascites.     OTHER TESTING:  Not available or performed      Aura Romberg, MD  [facility] 13 of [address], [address] [place] [address], [address]  [phone]

## 2018-05-30 NOTE — PROGRESS NOTES
Binh Rodriguez is a 56 y.o. female      1. Have you been to the ER, urgent care clinic or hospitalized since your last visit? NO.     2. Have you seen or consulted any other health care providers outside of the [address] [place] [address] since your last visit (Include any pap smears or colon screening)?  NO          Learning Assessment 8/18/2016   PRIMARY LEARNER Patient   PRIMARY LANGUAGE ENGLISH   LEARNER PREFERENCE PRIMARY DEMONSTRATION   ANSWERED BY self   RELATIONSHIP SELF

## 2018-05-30 NOTE — MR AVS SNAPSHOT
[place] [address] 
[phone] Patient: Pedro Stewart MRN: [id number] :1960 Visit Information Date & Time Provider Department Dept. Phone Encounter #  
 2018 11:00 AM MD Umesh Carbajal 13 of  [address] [id number] Follow-up Instructions Return in about 4 months (around 2018) for NP. Upcoming Health Maintenance Date Due DTaP/Tdap/Td series (1 - Tdap) 1983 PAP AKA CERVICAL CYTOLOGY 1983 BREAST CANCER SCRN MAMMOGRAM 2012 FOBT Q 1 YEAR AGE 50-75 2012 Influenza Age 5 to Adult 2018 Allergies as of 2018  Review Complete On: 2018 By: Tequila Fernandez Severity Noted Reaction Type Reactions Stadol [Butorphanol Tartrate]  2016    Hives Sulfa (Sulfonamide Antibiotics)  2017    Hives Current Immunizations  Never Reviewed No immunizations on file. Not reviewed this visit Vitals BP Pulse Temp Resp Height(growth percentile) 132/76 (BP 1 Location: Right arm, BP Patient Position: Sitting) 66 97.8 °F (36.6 °C) (Tympanic) 18 5' 7\" (1.702 m) Weight(growth percentile) SpO2 BMI OB Status Smoking Status 163 lb (73.9 kg) 98% 25.53 kg/m2 Menopause Never Smoker BMI and BSA Data Body Mass Index Body Surface Area 25.53 kg/m 2 1.87 m 2 Preferred Pharmacy Pharmacy Name Phone [address] [facility] [phone] [place] [address] Jeri Serrano Luzma [phone] Your Updated Medication List  
  
   
This list is accurate as of 18 12:07 PM.  Always use your most recent med list.  
  
  
  
  
 escitalopram oxalate 20 mg tablet Commonly known as:  Colen Brunner 20 mg.  
  
 ibuprofen 200 mg tablet Commonly known as:  MOTRIN  
200 mg. LORazepam 1 mg tablet Commonly known as:  ATIVAN  
 TAKE 1/2 TO 1 TABLET BY MOUTH 3 TIMES AS NEEDED FOR ANXIETY  
  
 ZETIA 10 mg tablet Generic drug:  ezetimibe Take  by mouth. Follow-up Instructions Return in about 4 months (around 9/30/2018) for NP. Introducing \[facility]\"" & [facility] SERVICES! Dear Bravo Churchill: Thank you for requesting a [employer] account. Our records indicate that you already have an active [employer] account. You can access your account anytime at [email or web address] Did you know that you can access your hospital and ER discharge instructions at any time in [employer] You can also review all of your test results from your hospital stay or ER visit. Additional Information If you have questions, please visit the Frequently Asked Questions section of the [employer] website at [email or web address] Remember, [employer] is NOT to be used for urgent needs. For medical emergencies, dial 911. Now available from your iPhone and Android! Please provide this summary of care documentation to your next provider. Your primary care clinician is listed as Asmita Escamilla. If you have any questions after today's visit, please call [phone].

## 2018-10-22 ENCOUNTER — HOSPITAL ENCOUNTER (OUTPATIENT)
Dept: LAB | Age: 58
Discharge: HOME | End: 2018-10-22
Payer: OTHER GOVERNMENT

## 2018-10-22 ENCOUNTER — OFFICE VISIT (OUTPATIENT)
Dept: HEMATOLOGY | Age: 58
End: 2018-10-22

## 2018-10-22 VITALS
HEART RATE: 75 BPM | WEIGHT: 171 LBS | SYSTOLIC BLOOD PRESSURE: 131 MMHG | HEIGHT: 67 IN | OXYGEN SATURATION: 96 % | RESPIRATION RATE: 16 BRPM | TEMPERATURE: 98.7 F | BODY MASS INDEX: 26.84 KG/M2 | DIASTOLIC BLOOD PRESSURE: 82 MMHG

## 2018-10-22 DIAGNOSIS — K75.81 NONALCOHOLIC STEATOHEPATITIS (NASH): ICD-10-CM

## 2018-10-22 DIAGNOSIS — K75.81 NONALCOHOLIC STEATOHEPATITIS (NASH): Primary | ICD-10-CM

## 2018-10-22 LAB
ALBUMIN SERPL-MCNC: 4.2 G/DL (ref 3.4–5)
ALBUMIN/GLOB SERPL: 1.2 {RATIO} (ref 0.8–1.7)
ALP SERPL-CCNC: 132 U/L (ref 45–117)
ALT SERPL-CCNC: 43 U/L (ref 13–56)
ANION GAP SERPL CALC-SCNC: 5 MMOL/L (ref 3–18)
AST SERPL-CCNC: 28 U/L (ref 15–37)
BASOPHILS # BLD: 0 K/UL (ref 0–0.1)
BASOPHILS NFR BLD: 1 % (ref 0–2)
BILIRUB DIRECT SERPL-MCNC: 0.1 MG/DL (ref 0–0.2)
BILIRUB SERPL-MCNC: 0.3 MG/DL (ref 0.2–1)
BUN SERPL-MCNC: 18 MG/DL (ref 7–18)
BUN/CREAT SERPL: 20 (ref 12–20)
CALCIUM SERPL-MCNC: 9.7 MG/DL (ref 8.5–10.1)
CHLORIDE SERPL-SCNC: 103 MMOL/L (ref 100–108)
CO2 SERPL-SCNC: 31 MMOL/L (ref 21–32)
CREAT SERPL-MCNC: 0.92 MG/DL (ref 0.6–1.3)
DIFFERENTIAL METHOD BLD: ABNORMAL
EOSINOPHIL # BLD: 0.1 K/UL (ref 0–0.4)
EOSINOPHIL NFR BLD: 2 % (ref 0–5)
ERYTHROCYTE [DISTWIDTH] IN BLOOD BY AUTOMATED COUNT: 12.6 % (ref 11.6–14.5)
GLOBULIN SER CALC-MCNC: 3.6 G/DL (ref 2–4)
GLUCOSE SERPL-MCNC: 79 MG/DL (ref 74–99)
HCT VFR BLD AUTO: 41.3 % (ref 35–45)
HGB BLD-MCNC: 13.7 G/DL (ref 12–16)
LYMPHOCYTES # BLD: 2.1 K/UL (ref 0.9–3.6)
LYMPHOCYTES NFR BLD: 33 % (ref 21–52)
MCH RBC QN AUTO: 30.5 PG (ref 24–34)
MCHC RBC AUTO-ENTMCNC: 33.2 G/DL (ref 31–37)
MCV RBC AUTO: 92 FL (ref 74–97)
MONOCYTES # BLD: 0.8 K/UL (ref 0.05–1.2)
MONOCYTES NFR BLD: 13 % (ref 3–10)
NEUTS SEG # BLD: 3.3 K/UL (ref 1.8–8)
NEUTS SEG NFR BLD: 51 % (ref 40–73)
PLATELET # BLD AUTO: 232 K/UL (ref 135–420)
PMV BLD AUTO: 10.9 FL (ref 9.2–11.8)
POTASSIUM SERPL-SCNC: 4.4 MMOL/L (ref 3.5–5.5)
PROT SERPL-MCNC: 7.8 G/DL (ref 6.4–8.2)
RBC # BLD AUTO: 4.49 M/UL (ref 4.2–5.3)
SODIUM SERPL-SCNC: 139 MMOL/L (ref 136–145)
WBC # BLD AUTO: 6.3 K/UL (ref 4.6–13.2)

## 2018-10-22 PROCEDURE — 85025 COMPLETE CBC W/AUTO DIFF WBC: CPT | Performed by: NURSE PRACTITIONER

## 2018-10-22 PROCEDURE — 80048 BASIC METABOLIC PNL TOTAL CA: CPT | Performed by: NURSE PRACTITIONER

## 2018-10-22 PROCEDURE — 80076 HEPATIC FUNCTION PANEL: CPT | Performed by: NURSE PRACTITIONER

## 2018-10-22 PROCEDURE — 36415 COLL VENOUS BLD VENIPUNCTURE: CPT | Performed by: NURSE PRACTITIONER

## 2018-10-22 NOTE — PROGRESS NOTES
[address] [place] [address] [address], [place], [address], [place], [place]       Richard Ridley, TAYLER Harmon, JV Feliciano, [facility]-BC   TAYLER Mendoza NP [address] [facility] [address] 136    at [address] [facility] [address]    [address] [place] [address], [address] Nadia Hardwick  22.    [phone]    FAX: [address] [place] [address] Avenue    [address], [address] [facility], [address]    [phone]    FAX: [phone]     Patient Care Team:  Brice Gitelman, MD as PCP - General (Internal Medicine)      Problem List  Date Reviewed: 10/22/2018          Codes Class Noted    Borderline high serum cholesterol ICD-10-CM: E78.9  ICD-9-CM: 272.9  12/14/2016        Menopausal and postmenopausal disorder ICD-10-CM: N95.9  ICD-9-CM: 627.9  12/14/2016        Nonalcoholic steatohepatitis (CARMONA) ICD-10-CM: K75.81  ICD-9-CM: 571.8  12/14/2016        Incomplete bladder emptying ICD-10-CM: R33.9  ICD-9-CM: 788.21  12/14/2016        Microscopic hematuria ICD-10-CM: R31.29  ICD-9-CM: 599.72  12/8/2016        Hypercholesterolemia ICD-10-CM: E78.00  ICD-9-CM: 272.0  8/18/2016        S/P appendectomy ICD-10-CM: Z90.49  ICD-9-CM: V45.89  8/18/2016              Michael Uriarte returns to the [address] for management CARMONA. The active problem list, all pertinent past medical history, liver histology, medications, radiologic findings and laboratory findings related to the liver disorder were reviewed with the patient. The patient is a 56 y.o.  female who was noted to have abnormalities in liver transaminases in 2/2016. The patient has no symptoms which could be attributed to the liver disorder. The patient completes all daily activities without any functional limitations.       The patient has not experienced fatigue, pain in the right side over the liver,       ASSESSMENT AND PLAN:  NAFLD   The patient underwent a liver biopsy in 10/2016. This demonstrated NAFLD with portal fibrosis. Elastography 9/2016 was also consistent with portal fibrosis. The need to assess liver fibrosis was discussed. Sheer wave elastography can assess liver fibrosis and determine if a patient has advanced fibrosis or cirrhosis without the need for liver biopsy. Sheer wave elastography is now available at [facility] [employer]. Elastography can be repeated annually to demonstrate that the treatment is resolving hepatic fibrosis. The patient was counseled regarding diet and exercise to achieve weight loss. The best diet for patients with fatty liver is one very low in carbohydrates and enriched with protein such as an Noelle's program.    The patient was told not to consume any food products and drinks containing fructose as this enhances hepatic fat synthesis. Liver transaminases are now normal.  Alkaline phosphate is elevated  Liver function is normal.  The platelet count is normal.    We will continue to monitor the patient at periodic intervals. If weight loss is successful the fat will resolve from the liver and liver enzymes should return to the normal range. We would then repeat an ultrasound to see if this also returns to normal.  If liver enzymes do not return to normal with weight reduction additional evaluation may be necessary over time. Treatment of other medical problems in patients with chronic liver disease  The patient was directed to continue all current medications at the current dosages. There are no contraindications for the patient to take any medications that are necessary for treatment of other medical issues. The patient can take oral diabetic agents for treatment of diabetes and statins for treatment of hypercholesterolemia. This will not impact the patient's current liver disease.   The patient does not have cirrhosis. Normal doses of NSAIDs can be used for pain as needed. Counseling for alcohol in patients with chronic liver disease  The patient was counseled regarding alcohol consumption and the effect of alcohol on chronic liver disease. The patient was reminded that alcohol can cause fatty liver. Vaccinations   Vaccination for viral hepatitis A and B is recommended since the patient has no serologic evidence of previous exposure or vaccination with immunity. Routine vaccinations against other bacterial and viral agents can be performed as indicated. Annual flu vaccination should be administered if indicated. ALLERGIES  Allergies   Allergen Reactions    Stadol [Butorphanol Tartrate] Hives    Sulfa (Sulfonamide Antibiotics) Hives       MEDICATIONS  Current Outpatient Medications   Medication Sig    escitalopram oxalate (LEXAPRO) 20 mg tablet 20 mg.    ibuprofen (MOTRIN) 200 mg tablet 200 mg. No current facility-administered medications for this visit. SYSTEM REVIEW NOT RELATED TO LIVER DISEASE OR REVIEWED ABOVE:  Constitution systems: Negative for fever, chills, weight gain, weight loss. Eyes: Negative for visual changes. ENT: Negative for sore throat, painful swallowing. Respiratory: Negative for cough, hemoptysis, SOB. Cardiology: Negative for chest pain, palpitations. GI:  Negative for constipation or diarrhea. : Negative for urinary frequency, dysuria, hematuria, nocturia. Skin: Negative for rash. Hematology: Negative for easy bruising, blood clots. Musculo-skelatal: Right flank pain. Neurologic: Negative for headaches, dizziness, vertigo, memory problems not related to HE. Psychology: Negative for anxiety, depression. FAMILY HISTORY:  The father  of GB rupture. The mother  of CHF. There is no family history of liver disease. SOCIAL HISTORY:  The patient is .     The patient has 2 children,   The patient has never used tobacco products. The patient consumes 2-3 alcoholic beverages per weekend. The patient currently works full time title research. PHYSICAL EXAMINATION:  Visit Vitals  /82 (BP 1 Location: Right arm, BP Patient Position: Sitting)   Pulse 75   Temp 98.7 °F (37.1 °C) (Tympanic)   Resp 16   Ht 5' 7\" (1.702 m)   Wt 171 lb (77.6 kg)   SpO2 96%   BMI 26.78 kg/m²     General: No acute distress. Eyes: Sclera anicteric. ENT: No oral lesions. Thyroid normal.  Nodes: No adenopathy. Skin: No spider angiomata. No jaundice. No palmar erythema. Respiratory: Lungs clear to auscultation. Cardiovascular: Regular heart rate. No murmurs. No JVD. Abdomen: Soft non-tender. Liver size normal to percussion/palpation. Spleen not palpable. No obvious ascites. Extremities: No edema. No muscle wasting. No gross arthritic changes. Neurologic: Alert and oriented. Cranial nerves grossly intact. No asterixis.     LABORATORY STUDIES:  Liver [place] of [address] Units 10/22/2018   WBC 4.6 - 13.2 K/uL 6.3   ANC 1.8 - 8.0 K/UL 3.3   HGB 12.0 - 16.0 g/dL 13.7    - 420 K/uL 232   INR 0.8 - 1.2      AST 15 - 37 U/L 28   ALT 13 - 56 U/L 43   Alk Phos 45 - 117 U/L 132 (H)   Bili, Total 0.2 - 1.0 MG/DL 0.3   Bili, Direct 0.0 - 0.2 MG/DL 0.1   Albumin 3.4 - 5.0 g/dL 4.2   BUN 7.0 - 18 MG/DL 18   Creat 0.6 - 1.3 MG/DL 0.92   Na 136 - 145 mmol/L 139   K 3.5 - 5.5 mmol/L 4.4   Cl 100 - 108 mmol/L 103   CO2 21 - 32 mmol/L 31   Glucose 74 - 99 mg/dL 79   Magnesium 1.8 - 2.4 mg/dL      SEROLOGIES:  Serologies Latest Ref Rng 8/18/2016   Hep A Ab, Total NEGATIVE   NEGATIVE   Hep B Surface Ag <1.00 Index <0.10   Hep B Surface Ag Interp NEG   NEGATIVE   Hep B Core Ab, Total NEGATIVE   NEGATIVE   Hep B Surface Ab >10.0 mIU/mL <3.10 (L)   Hep B Surface Ab Interp POS   NEGATIVE (A)   Hep C Ab 0.0 - 0.9 s/co ratio <0.1   Ferritin 8 - 388 NG/ML 77   Iron % Saturation % 26   JOCY, IFA  NEGATIVE   ASMCA 0 - 19 Units 15 Ceruloplasmin 19.0 - 39.0 mg/dL 26.1   Alpha-1 antitrypsin level 90 - 200 mg/dL 91     LIVER HISTOLOGY:  9/2016. Ultrasound sheer wave elastography was performed at THE [facility]. E Range: 4.60-6.55 kPa. E Mean: 5.52 kPa. E Median: 5.66 kPa. E Std: 0.62 kPa  The results suggested a fibrosis level of F1.  10/2016. Slides reviewed by MLS. CARMONA.  33-66% macro and micovesicular steatosis. Mild ballooning. Mild inflammation. Portal fibrosis. DINORA score 5 (211). 5/2018. Sheer wave elastography performed at THE [facility]. EkPa was E Range: 7.34-11.68 kPa, E Mean: 9.60 kPa, E Median: 9.73 kPa, E Std: 1.50 kPa. The results suggested a fibrosis level of F3. ENDOSCOPIC PROCEDURES:  Not available or performed     RADIOLOGY:  4/2016. Ultrasound of liver. Echogenic consistent with chronic liver disease or fatty lvier. No liver mass lesions. No dilated bile ducts. No ascites. 9/2016. Ultrasound of liver. Echogenic consistent with chronic liver disease. No liver mass lesions. No dilated bile ducts. No ascites. 5/2018. Ultrasound of liver. Echogenic consistent with fatty liver. No liver mass lesions. No dilated bile ducts. No ascites. OTHER TESTING:  Not available or performed    FOLLOW UP:  All of the above issues were discussed with the patient. All questions were answered. The patient expressed a clear understanding of the above. [address] in 4 months.       Willow Meneses, AGPCNP-BC  [address] Liver [place] [facility]  4 [facility], [address] [facility]  98 [facility], [address]  [phone]

## 2018-10-22 NOTE — PROGRESS NOTES
1. Have you been to the ER, urgent care clinic since your last visit? Hospitalized since your last visit? No    2. Have you seen or consulted any other health care providers outside of the [address] [place] [address] since your last visit? Include any pap smears or colon screening.  No     Learning Assessment 8/18/2016   PRIMARY LEARNER Patient   PRIMARY LANGUAGE ENGLISH   LEARNER PREFERENCE PRIMARY DEMONSTRATION   ANSWERED BY self   RELATIONSHIP SELF

## 2019-03-18 ENCOUNTER — HOSPITAL ENCOUNTER (OUTPATIENT)
Dept: LAB | Age: 59
Discharge: HOME | End: 2019-03-18
Payer: OTHER GOVERNMENT

## 2019-03-18 ENCOUNTER — OFFICE VISIT (OUTPATIENT)
Dept: HEMATOLOGY | Age: 59
End: 2019-03-18

## 2019-03-18 VITALS
DIASTOLIC BLOOD PRESSURE: 86 MMHG | OXYGEN SATURATION: 97 % | HEIGHT: 67 IN | WEIGHT: 180.4 LBS | RESPIRATION RATE: 17 BRPM | BODY MASS INDEX: 28.31 KG/M2 | TEMPERATURE: 97.5 F | HEART RATE: 72 BPM | SYSTOLIC BLOOD PRESSURE: 140 MMHG

## 2019-03-18 DIAGNOSIS — K75.81 NONALCOHOLIC STEATOHEPATITIS (NASH): Primary | ICD-10-CM

## 2019-03-18 DIAGNOSIS — K75.81 NONALCOHOLIC STEATOHEPATITIS (NASH): ICD-10-CM

## 2019-03-18 LAB
ALBUMIN SERPL-MCNC: 3.9 G/DL (ref 3.4–5)
ALBUMIN/GLOB SERPL: 1.2 {RATIO} (ref 0.8–1.7)
ALP SERPL-CCNC: 112 U/L (ref 45–117)
ALT SERPL-CCNC: 42 U/L (ref 13–56)
AST SERPL-CCNC: 31 U/L (ref 15–37)
BILIRUB DIRECT SERPL-MCNC: 0.1 MG/DL (ref 0–0.2)
BILIRUB SERPL-MCNC: 0.4 MG/DL (ref 0.2–1)
GLOBULIN SER CALC-MCNC: 3.3 G/DL (ref 2–4)
PROT SERPL-MCNC: 7.2 G/DL (ref 6.4–8.2)

## 2019-03-18 PROCEDURE — 80076 HEPATIC FUNCTION PANEL: CPT

## 2019-03-18 PROCEDURE — 36415 COLL VENOUS BLD VENIPUNCTURE: CPT

## 2019-03-18 NOTE — PROGRESS NOTES
1. Have you been to the ER, urgent care clinic since your last visit? Hospitalized since your last visit? No    2. Have you seen or consulted any other health care providers outside of the [facility] since your last visit? Include any pap smears or colon screening.  Dr. Promise Hough

## 2019-03-18 NOTE — PROGRESS NOTES
[address] [address] [address], [place], [address], [place], [place]       Javon Vitale, TAYLER Talley, JV Ernst, [facility]   TAYLER Ledesma NP [facility] [place] [address]    at [place] [address], [address] Nadia Hardwick  22. [phone]    FAX: [address] [place] [address], [address]    [phone]    FAX: [phone]     Patient Care Team:  Juanito Lozada MD as PCP - General (Internal Medicine)      Problem List  Date Reviewed: 10/22/2018          Codes Class Noted    Borderline high serum cholesterol ICD-10-CM: E78.9  ICD-9-CM: 272.9  12/14/2016        Menopausal and postmenopausal disorder ICD-10-CM: N95.9  ICD-9-CM: 627.9  12/14/2016        Nonalcoholic steatohepatitis (CARMONA) ICD-10-CM: K75.81  ICD-9-CM: 571.8  12/14/2016        Incomplete bladder emptying ICD-10-CM: R33.9  ICD-9-CM: 788.21  12/14/2016        Microscopic hematuria ICD-10-CM: R31.29  ICD-9-CM: 599.72  12/8/2016        Hypercholesterolemia ICD-10-CM: E78.00  ICD-9-CM: 272.0  8/18/2016        S/P appendectomy ICD-10-CM: Z90.49  ICD-9-CM: V45.89  8/18/2016              Zoey Vargas returns to the [address] for management CARMONA. The active problem list, all pertinent past medical history, liver histology, medications, radiologic findings and laboratory findings related to the liver disorder were reviewed with the patient. The patient is a 66 y.o.  female who was noted to have abnormalities in liver transaminases in 2/2016. Assessment of liver fibrosis was performed with sheer wave elastography at THE [facility] in 5/2018. The result was E Mean: 9.60 kPa which correlates with bridging fibrosis.     The patient notes pain in the right side over the liver    The patient has not experienced diffuse abdominal pain, yellowing of the eyes or skin, itching, dark urine. The patient completes all daily activities without any functional limitations. ASSESSMENT AND PLAN:  CARMONA  The patient underwent a liver biopsy in 10/2016. Elastography 9/2016 was also consistent with portal fibrosis. The need to assess liver fibrosis was discussed. Sheer wave elastography can assess liver fibrosis and determine if a patient has advanced fibrosis or cirrhosis without the need for liver biopsy. Sheer wave elastography is now available at [facility]. Elastography can be repeated annually to demonstrate that the treatment is resolving hepatic fibrosis. The patient was counseled regarding diet and exercise to achieve weight loss. The best diet for patients with fatty liver is one very low in carbohydrates and enriched with protein such as an Noelle's program.    The patient was told not to consume any food products and drinks containing fructose as this enhances hepatic fat synthesis. Liver transaminases are now normal.  Alkaline phosphate is elevated  Liver function is normal.  The platelet count is normal.    We will continue to monitor the patient at periodic intervals. If weight loss is successful the fat will resolve from the liver and liver enzymes should return to the normal range. We would then repeat an ultrasound to see if this also returns to normal.      Treatment of other medical problems in patients with chronic liver disease  The patient was directed to continue all current medications at the current dosages. There are no contraindications for the patient to take any medications that are necessary for treatment of other medical issues. The patient can take oral diabetic agents for treatment of diabetes and statins for treatment of hypercholesterolemia. This will not impact the patient's current liver disease.   The patient does not have cirrhosis. Normal doses of NSAIDs can be used for pain as needed. Counseling for alcohol in patients with chronic liver disease  The patient was counseled regarding alcohol consumption and the effect of alcohol on chronic liver disease. The patient was reminded that alcohol can cause fatty liver. Vaccinations   Vaccination for viral hepatitis A and B is recommended since the patient has no serologic evidence of previous exposure or vaccination with immunity. Routine vaccinations against other bacterial and viral agents can be performed as indicated. Annual flu vaccination should be administered if indicated. ALLERGIES  Allergies   Allergen Reactions    Stadol [Butorphanol Tartrate] Hives    Sulfa (Sulfonamide Antibiotics) Hives       MEDICATIONS  Current Outpatient Medications   Medication Sig    escitalopram oxalate (LEXAPRO) 20 mg tablet 20 mg.    ibuprofen (MOTRIN) 200 mg tablet 200 mg. No current facility-administered medications for this visit. SYSTEM REVIEW NOT RELATED TO LIVER DISEASE OR REVIEWED ABOVE:  Constitution systems: Negative for fever, chills, weight gain, weight loss. Eyes: Negative for visual changes. ENT: Negative for sore throat, painful swallowing. Respiratory: Negative for cough, hemoptysis, SOB. Cardiology: Negative for chest pain, palpitations. GI:  Negative for constipation or diarrhea. : Negative for urinary frequency, dysuria, hematuria, nocturia. Skin: Negative for rash. Hematology: Negative for easy bruising, blood clots. Musculo-skelatal: Right flank pain. Neurologic: Negative for headaches, dizziness, vertigo, memory problems not related to HE. Psychology: Negative for anxiety, depression. FAMILY HISTORY:  The father  of GB rupture. The mother  of CHF. There is no family history of liver disease. SOCIAL HISTORY:  The patient is .     The patient has 2 children,   The patient has never used tobacco products. The patient consumes 2-3 alcoholic beverages per weekend. The patient currently works full time title research. PHYSICAL EXAMINATION:  Visit Vitals  /86 (BP 1 Location: Right arm, BP Patient Position: Sitting)   Pulse 72   Temp 97.5 °F (36.4 °C)   Resp 17   Ht 5' 7\" (1.702 m)   Wt 180 lb 6.4 oz (81.8 kg)   SpO2 97%   BMI 28.25 kg/m²     General: No acute distress. Eyes: Sclera anicteric. ENT: No oral lesions. Thyroid normal.  Nodes: No adenopathy. Skin: No spider angiomata. No jaundice. No palmar erythema. Respiratory: Lungs clear to auscultation. Cardiovascular: Regular heart rate. No murmurs. No JVD. Abdomen: Soft non-tender. Liver size normal to percussion/palpation. Spleen not palpable. No obvious ascites. Extremities: No edema. No muscle wasting. No gross arthritic changes. Neurologic: Alert and oriented. Cranial nerves grossly intact. No asterixis.     LABORATORY STUDIES:  Liver [place] of [address] Units 10/22/2018   WBC 4.6 - 13.2 K/uL 6.3   ANC 1.8 - 8.0 K/UL 3.3   HGB 12.0 - 16.0 g/dL 13.7    - 420 K/uL 232   AST 15 - 37 U/L 28   ALT 13 - 56 U/L 43   Alk Phos 45 - 117 U/L 132 (H)   Bili, Total 0.2 - 1.0 MG/DL 0.3   Bili, Direct 0.0 - 0.2 MG/DL 0.1   Albumin 3.4 - 5.0 g/dL 4.2   BUN 7.0 - 18 MG/DL 18   Creat 0.6 - 1.3 MG/DL 0.92   Na 136 - 145 mmol/L 139   K 3.5 - 5.5 mmol/L 4.4   Cl 100 - 108 mmol/L 103   CO2 21 - 32 mmol/L 31   Glucose 74 - 99 mg/dL 79     SEROLOGIES:  Serologies Latest Ref Rn 8/18/2016   Hep A Ab, Total NEGATIVE   NEGATIVE   Hep B Surface Ag <1.00 Index <0.10   Hep B Surface Ag Interp NEG   NEGATIVE   Hep B Core Ab, Total NEGATIVE   NEGATIVE   Hep B Surface Ab >10.0 mIU/mL <3.10 (L)   Hep B Surface Ab Interp POS   NEGATIVE (A)   Hep C Ab 0.0 - 0.9 s/co ratio <0.1   Ferritin 8 - 388 NG/ML 77   Iron % Saturation % 26   JOCY, IFA  NEGATIVE   ASMCA 0 - 19 Units 15   Ceruloplasmin 19.0 - 39.0 mg/dL 26.1   Alpha-1 antitrypsin level 90 - 200 mg/dL 91     LIVER HISTOLOGY:  9/2016. Ultrasound sheer wave elastography was performed at THE [facility]. E Range: 4.60-6.55 kPa. E Mean: 5.52 kPa. E Median: 5.66 kPa. E Std: 0.62 kPa  The results suggested a fibrosis level of F1.  10/2016. Slides reviewed by MLS. CARMONA.  33-66% macro and micovesicular steatosis. Mild ballooning. Mild inflammation. Portal fibrosis. DINORA score 5 (211). 5/2018. Sheer wave elastography performed at THE [facility]. EkPa was E Range: 7.34-11.68 kPa, E Mean: 9.60 kPa, E Median: 9.73 kPa, E Std: 1.50 kPa. The results suggested a fibrosis level of F3. ENDOSCOPIC PROCEDURES:  Not available or performed     RADIOLOGY:  4/2016. Ultrasound of liver. Echogenic consistent with chronic liver disease or fatty lvier. No liver mass lesions. No dilated bile ducts. No ascites. 9/2016. Ultrasound of liver. Echogenic consistent with chronic liver disease. No liver mass lesions. No dilated bile ducts. No ascites. 5/2018. Ultrasound of liver. Echogenic consistent with fatty liver. No liver mass lesions. No dilated bile ducts. No ascites. OTHER TESTING:  Not available or performed    FOLLOW UP:  All of the above issues were discussed with the patient. All questions were answered. The patient expressed a clear understanding of the above. [address] [place] [address] in 6 months for routine monitoring.      Aparna Jacinto AGPCNP-BC  [address] [place] [address]  [address] [place] [address], [address] [place] [address], [address] [place] [address] Street - [address]  [phone]

## 2019-09-06 ENCOUNTER — HOSPITAL ENCOUNTER (OUTPATIENT)
Dept: ULTRASOUND IMAGING | Age: 59
Discharge: HOME | End: 2019-09-06
Attending: NURSE PRACTITIONER
Payer: OTHER GOVERNMENT

## 2019-09-06 DIAGNOSIS — K75.81 NONALCOHOLIC STEATOHEPATITIS (NASH): ICD-10-CM

## 2019-09-06 PROCEDURE — 76981 USE PARENCHYMA: CPT

## 2019-09-19 ENCOUNTER — HOSPITAL ENCOUNTER (OUTPATIENT)
Dept: LAB | Age: 59
Discharge: HOME | End: 2019-09-19
Payer: OTHER GOVERNMENT

## 2019-09-19 ENCOUNTER — OFFICE VISIT (OUTPATIENT)
Dept: HEMATOLOGY | Age: 59
End: 2019-09-19

## 2019-09-19 VITALS
HEART RATE: 86 BPM | TEMPERATURE: 97.5 F | SYSTOLIC BLOOD PRESSURE: 138 MMHG | OXYGEN SATURATION: 98 % | RESPIRATION RATE: 16 BRPM | HEIGHT: 67 IN | BODY MASS INDEX: 29.82 KG/M2 | DIASTOLIC BLOOD PRESSURE: 78 MMHG | WEIGHT: 190 LBS

## 2019-09-19 DIAGNOSIS — K75.81 NONALCOHOLIC STEATOHEPATITIS (NASH): Primary | ICD-10-CM

## 2019-09-19 DIAGNOSIS — K75.81 NONALCOHOLIC STEATOHEPATITIS (NASH): ICD-10-CM

## 2019-09-19 LAB
ALBUMIN SERPL-MCNC: 3.8 G/DL (ref 3.4–5)
ALBUMIN/GLOB SERPL: 1.2 {RATIO} (ref 0.8–1.7)
ALP SERPL-CCNC: 105 U/L (ref 45–117)
ALT SERPL-CCNC: 51 U/L (ref 13–56)
ANION GAP SERPL CALC-SCNC: 5 MMOL/L (ref 3–18)
AST SERPL-CCNC: 29 U/L (ref 10–38)
BASOPHILS # BLD: 0 K/UL (ref 0–0.1)
BASOPHILS NFR BLD: 1 % (ref 0–2)
BILIRUB DIRECT SERPL-MCNC: 0.1 MG/DL (ref 0–0.2)
BILIRUB SERPL-MCNC: 0.3 MG/DL (ref 0.2–1)
BUN SERPL-MCNC: 16 MG/DL (ref 7–18)
BUN/CREAT SERPL: 18 (ref 12–20)
CALCIUM SERPL-MCNC: 8.8 MG/DL (ref 8.5–10.1)
CHLORIDE SERPL-SCNC: 105 MMOL/L (ref 100–111)
CO2 SERPL-SCNC: 29 MMOL/L (ref 21–32)
CREAT SERPL-MCNC: 0.89 MG/DL (ref 0.6–1.3)
DIFFERENTIAL METHOD BLD: ABNORMAL
EOSINOPHIL # BLD: 0.2 K/UL (ref 0–0.4)
EOSINOPHIL NFR BLD: 4 % (ref 0–5)
ERYTHROCYTE [DISTWIDTH] IN BLOOD BY AUTOMATED COUNT: 12.6 % (ref 11.6–14.5)
GLOBULIN SER CALC-MCNC: 3.3 G/DL (ref 2–4)
GLUCOSE SERPL-MCNC: 84 MG/DL (ref 74–99)
HCT VFR BLD AUTO: 41.3 % (ref 35–45)
HGB BLD-MCNC: 13.2 G/DL (ref 12–16)
LYMPHOCYTES # BLD: 2 K/UL (ref 0.9–3.6)
LYMPHOCYTES NFR BLD: 34 % (ref 21–52)
MCH RBC QN AUTO: 29 PG (ref 24–34)
MCHC RBC AUTO-ENTMCNC: 32 G/DL (ref 31–37)
MCV RBC AUTO: 90.8 FL (ref 74–97)
MONOCYTES # BLD: 0.9 K/UL (ref 0.05–1.2)
MONOCYTES NFR BLD: 15 % (ref 3–10)
NEUTS SEG # BLD: 2.7 K/UL (ref 1.8–8)
NEUTS SEG NFR BLD: 46 % (ref 40–73)
PLATELET # BLD AUTO: 243 K/UL (ref 135–420)
PMV BLD AUTO: 10.7 FL (ref 9.2–11.8)
POTASSIUM SERPL-SCNC: 3.9 MMOL/L (ref 3.5–5.5)
PROT SERPL-MCNC: 7.1 G/DL (ref 6.4–8.2)
RBC # BLD AUTO: 4.55 M/UL (ref 4.2–5.3)
SODIUM SERPL-SCNC: 139 MMOL/L (ref 136–145)
WBC # BLD AUTO: 5.9 K/UL (ref 4.6–13.2)

## 2019-09-19 PROCEDURE — 80048 BASIC METABOLIC PNL TOTAL CA: CPT

## 2019-09-19 PROCEDURE — 85025 COMPLETE CBC W/AUTO DIFF WBC: CPT

## 2019-09-19 PROCEDURE — 36415 COLL VENOUS BLD VENIPUNCTURE: CPT

## 2019-09-19 PROCEDURE — 80076 HEPATIC FUNCTION PANEL: CPT

## 2019-09-19 NOTE — PROGRESS NOTES
[address] [facility], Aris KENDALL Cosme Ramal, MD Cindie Roup, JV Herrera, ACNP-BC     Roseline Martin, [facility]   José Miguel Joyenr FNP-DIANE Fernando, [facility]       Basil Lynn [address]    at [address], [facility] Nadia Hardwick  22.    [phone]    FAX: [address] [place] [address], [address]    [phone]    FAX: [phone]     Patient Care Team:  Tian Romero MD as PCP - General (Internal Medicine)      Problem List  Date Reviewed: 10/22/2018          Codes Class Noted    Borderline high serum cholesterol ICD-10-CM: E78.9  ICD-9-CM: 272.9  12/14/2016        Menopausal and postmenopausal disorder ICD-10-CM: N95.9  ICD-9-CM: 627.9  12/14/2016        Nonalcoholic steatohepatitis (CARMONA) ICD-10-CM: K75.81  ICD-9-CM: 571.8  12/14/2016        Incomplete bladder emptying ICD-10-CM: R33.9  ICD-9-CM: 788.21  12/14/2016        Microscopic hematuria ICD-10-CM: R31.29  ICD-9-CM: 599.72  12/8/2016        Hypercholesterolemia ICD-10-CM: E78.00  ICD-9-CM: 272.0  8/18/2016        S/P appendectomy ICD-10-CM: Z90.49  ICD-9-CM: V45.89  8/18/2016              [facility] CARMONA. The active problem list, all pertinent past medical history, liver histology, medications, radiologic findings and laboratory findings related to the liver disorder were reviewed with the patient. The patient is a 66 y.o.  female who was noted to have abnormalities in liver transaminases in 2/2016. The most recent imaging of the liver was Ultrasound performed in 9/2019. Results suggest chronic liver disease.       Assessment of liver fibrosis was performed with shear wave elastography at [facility] in 9/2019. The result was E mean 5.1 kPa which correlates with normal fibrosis. The patient notes occasional pain in the right side over the liver    The patient has not experienced diffuse abdominal pain, yellowing of the eyes or skin, itching, dark urine. The patient completes all daily activities without any functional limitations. ASSESSMENT AND PLAN:  CARMONA  The patient underwent a liver biopsy in 10/2016. Elastography 9/2016 was also consistent with portal fibrosis. The need to assess liver fibrosis was discussed. Shear wave elastography can assess liver fibrosis and determine if a patient has advanced fibrosis or cirrhosis without the need for liver biopsy. The elastography can be repeated annually or as often as clinically indicated to assess for fibrosis progression and/or regression. Diet and Weight Loss  The patient was counseled regarding diet and exercise to achieve weight loss. The best diet for patients with fatty liver is one very low in carbohydrates and enriched with protein such as an Noelle's program.    The patient was told not to consume any food products and drinks containing fructose as this enhances hepatic fat synthesis. Liver transaminases are now normal.  Alkaline phosphate is elevated. Liver function is normal.  The platelet count is normal.    We will continue to monitor the patient at periodic intervals. If weight loss is successful the fat will resolve from the liver and liver enzymes should return to the normal range. We would then repeat an ultrasound to see if this also returns to normal.      Treatment of other medical problems in patients with chronic liver disease  The patient was directed to continue all current medications at the current dosages. There are no contraindications for the patient to take any medications that are necessary for treatment of other medical issues.   The patient can take oral diabetic agents for treatment of diabetes and statins for treatment of hypercholesterolemia. This will not impact the patient's current liver disease. The patient does not have cirrhosis. Normal doses of NSAIDs can be used for pain as needed. Counseling for alcohol in patients with chronic liver disease  The patient was counseled regarding alcohol consumption and the effect of alcohol on chronic liver disease. The patient was reminded that alcohol can cause fatty liver. Vaccinations   Vaccination for viral hepatitis A and B is recommended since the patient has no serologic evidence of previous exposure or vaccination with immunity. Routine vaccinations against other bacterial and viral agents can be performed as indicated. Annual flu vaccination should be administered if indicated. ALLERGIES  Allergies   Allergen Reactions    Stadol [Butorphanol Tartrate] Hives    Sulfa (Sulfonamide Antibiotics) Hives       MEDICATIONS  Current Outpatient Medications   Medication Sig    escitalopram oxalate (LEXAPRO) 20 mg tablet 20 mg.    ibuprofen (MOTRIN) 200 mg tablet 200 mg. No current facility-administered medications for this visit. SYSTEM REVIEW NOT RELATED TO LIVER DISEASE OR REVIEWED ABOVE:  Constitution systems: Negative for fever, chills, weight gain, weight loss. Eyes: Negative for visual changes. ENT: Negative for sore throat, painful swallowing. Respiratory: Negative for cough, hemoptysis, SOB. Cardiology: Negative for chest pain, palpitations. GI:  Negative for constipation or diarrhea. : Negative for urinary frequency, dysuria, hematuria, nocturia. Skin: Negative for rash. Hematology: Negative for easy bruising, blood clots. Musculo-skelatal: Right flank pain. Neurologic: Negative for headaches, dizziness, vertigo, memory problems not related to HE. Psychology: Negative for anxiety, depression. FAMILY HISTORY:  The father  of GB rupture. The mother  of CHF. There is no family history of liver disease. SOCIAL HISTORY:  The patient is . The patient has 2 children  The patient has never used tobacco products. The patient consumes 2-3 alcoholic beverages per weekend. The patient currently works full time title research. PHYSICAL EXAMINATION:  Visit Vitals  /78 (BP 1 Location: Right arm, BP Patient Position: Sitting) Comment: MANUAL   Pulse 86   Temp 97.5 °F (36.4 °C) (Tympanic)   Resp 16   Ht 5' 7\" (1.702 m)   Wt 190 lb (86.2 kg)   SpO2 98%   BMI 29.76 kg/m²     General: No acute distress. Eyes: Sclera anicteric. ENT: No oral lesions. Thyroid normal.  Nodes: No adenopathy. Skin: No spider angiomata. No jaundice. No palmar erythema. Respiratory: Lungs clear to auscultation. Cardiovascular: Regular heart rate. No murmurs. No JVD. Abdomen: Soft non-tender. Liver size normal to percussion/palpation. Spleen not palpable. No obvious ascites. Extremities: No edema. No muscle wasting. No gross arthritic changes. Neurologic: Alert and oriented. Cranial nerves grossly intact. No asterixis.     LABORATORY STUDIES:  Liver [place] of [address] Units 3/18/2019 10/22/2018   WBC 4.6 - 13.2 K/uL  6.3   ANC 1.8 - 8.0 K/UL  3.3   HGB 12.0 - 16.0 g/dL  13.7    - 420 K/uL  232   AST 15 - 37 U/L 31 28   ALT 13 - 56 U/L 42 43   Alk Phos 45 - 117 U/L 112 132 (H)   Bili, Total 0.2 - 1.0 MG/DL 0.4 0.3   Bili, Direct 0.0 - 0.2 MG/DL 0.1 0.1   Albumin 3.4 - 5.0 g/dL 3.9 4.2   BUN 7.0 - 18 MG/DL  18   Creat 0.6 - 1.3 MG/DL  0.92   Na 136 - 145 mmol/L  139   K 3.5 - 5.5 mmol/L  4.4   Cl 100 - 108 mmol/L  103   CO2 21 - 32 mmol/L  31   Glucose 74 - 99 mg/dL  79     SEROLOGIES:  Serologies Latest Ref Rng 8/18/2016   Hep A Ab, Total NEGATIVE   NEGATIVE   Hep B Surface Ag <1.00 Index <0.10   Hep B Surface Ag Interp NEG   NEGATIVE   Hep B Core Ab, Total NEGATIVE   NEGATIVE   Hep B Surface Ab >10.0 mIU/mL <3.10 (L)   Hep B Surface Ab Interp POS   NEGATIVE (A)   Hep C Ab 0.0 - 0.9 s/co ratio <0.1   Ferritin 8 - 388 NG/ML 77   Iron % Saturation % 26   JOCY, IFA  NEGATIVE   ASMCA 0 - 19 Units 15   Ceruloplasmin 19.0 - 39.0 mg/dL 26.1   Alpha-1 antitrypsin level 90 - 200 mg/dL 91     LIVER HISTOLOGY:  9/2016. Ultrasound shear wave elastography was performed at [facility]. E Range: 4.60-6.55 kPa. E Mean: 5.52 kPa. E Median: 5.66 kPa. E Std: 0.62 kPa  The results suggested a fibrosis level of F1.  10/2016. Slides reviewed by MLS. CARMONA.  33-66% macro and micovesicular steatosis. Mild ballooning. Mild inflammation. Portal fibrosis. DINORA score 5 (211). 5/2018. Shear wave elastography performed at [facility]. EkPa was E Range: 7.34-11.68 kPa, E Mean: 9.60 kPa, E Median: 9.73 kPa, E Std: 1.50 kPa. The results suggested a fibrosis level of F3. ENDOSCOPIC PROCEDURES:  Not available or performed     RADIOLOGY:  4/2016. Ultrasound of liver. Echogenic consistent with chronic liver disease or fatty lvier. No liver mass lesions. No dilated bile ducts. No ascites. 9/2016. Ultrasound of liver. Echogenic consistent with chronic liver disease. No liver mass lesions. No dilated bile ducts. No ascites. 5/2018. Ultrasound of liver. Echogenic consistent with fatty liver. No liver mass lesions. No dilated bile ducts. No ascites. OTHER TESTING:  Not available or performed    FOLLOW UP:  All of the above issues were discussed with the patient. All questions were answered. The patient expressed a clear understanding of the above. [address] [address] in 6 months for routine monitoring.        CESAR Slaughter-BC  [address] Liver [place] [facility]  4 [facility], [address] [place] [address]  98 [facility], [address]  [phone]

## 2020-04-30 ENCOUNTER — VIRTUAL VISIT (OUTPATIENT)
Dept: HEMATOLOGY | Age: 60
End: 2020-04-30

## 2020-04-30 DIAGNOSIS — K75.81 NONALCOHOLIC STEATOHEPATITIS (NASH): Primary | ICD-10-CM

## 2020-04-30 NOTE — PROGRESS NOTES
VIRTUAL TELEHEALTH VISIT PERFORMED DUE TO COVID-19 EPIDEMIC    CONSENT:  Mishel Hernandez, who was seen by synchronous, real-time, audio-video technology, and/or her healthcare decision maker, is aware that this patient-initiated, Telehealth encounter on 4/30/2020 is a billable service, with coverage as determined by her insurance carrier. She is aware that she may receive a bill and has provided verbal consent to proceed. This patient was evaluated during a Virtual Telehealth visit. A caregiver was present if appropriate.  Due to this being a TeleHealth encounter performed during the [facility]-14 public health emergency, the physical examination was limited to that listed in the [address] [place] [address], MD, [address], Cite Narciso Birch MD Cayetano Holiday, PA-DIANE Valdovinos, ACNP-BC     April S Mario, AGPCNP-BC   Lonnie Shukla, y [address], AGPCNP-BC       [place] [address]    at [address] 22.    [phone]    FAX: [address], [address]    [phone]    FAX: [phone]     Patient Care Team:  Neda [place], MD as PCP - General (Internal Medicine)      Problem List  Date Reviewed: 10/22/2018          Codes Class Noted    Borderline high serum cholesterol ICD-10-CM: E78.9  ICD-9-CM: 272.9  12/14/2016        Menopausal and postmenopausal disorder ICD-10-CM: N95.9  ICD-9-CM: 627.9  12/14/2016        Nonalcoholic steatohepatitis (CARMONA) ICD-10-CM: K75.81  ICD-9-CM: 571.8  12/14/2016        Incomplete bladder emptying ICD-10-CM: R33.9  ICD-9-CM: 788.21  12/14/2016        Microscopic hematuria ICD-10-CM: R31.29  ICD-9-CM: 599.72  12/8/2016 Hypercholesterolemia ICD-10-CM: E78.00  ICD-9-CM: 272.0  8/18/2016        S/P appendectomy ICD-10-CM: Z90.49  ICD-9-CM: V45.89  8/18/2016              Ishmael Nguyễn returns to the [place] [address] for management CARMONA. The active problem list, all pertinent past medical history, liver histology, medications, radiologic findings and laboratory findings related to the liver disorder were reviewed with the patient. The patient is a 64 y.o.  female who was noted to have abnormalities in liver transaminases in 2/2016. The most recent imaging of the liver was Ultrasound performed in 9/2019. Results suggest chronic liver disease. Assessment of liver fibrosis was performed with shear wave elastography at [facility] in 9/2019. The result was E mean 5.1 kPa which correlates with normal fibrosis. The patient has no symptoms which can be attributed to the liver disorder. The patient is not currently experiencing the following symptoms of liver disease:  fatigue, pain in the right side over the liver. The patient completes all daily activities without any functional limitations. ASSESSMENT AND PLAN:  CARMONA  The patient underwent a liver biopsy in 10/2016. Elastography 9/2016 was also consistent with portal fibrosis. The need to perform an assessment of liver fibrosis was discussed with the patient. The Fibroscan can assess liver fibrosis and determine if a patient has advanced fibrosis or cirrhosis without the need for liver biopsy. This will be performed at the next office visit. The Fibroscan can be repeated annually or as often as clinically indicated to assess for fibrosis progression and/or regression. Diet and Weight Loss  The patient was counseled regarding diet and exercise to achieve weight loss.   The best diet for patients with fatty liver is one very low in carbohydrates and enriched with protein such as an Noelle's program.    The patient was told not to consume any food products and drinks containing fructose as this enhances hepatic fat synthesis. AST is normal. ALT is elevated. Alkaline phosphate is normal. Liver function is normal.  The platelet count is normal.      We will continue to monitor the patient at periodic intervals. If weight loss is successful the fat will resolve from the liver and liver enzymes should return to the normal range. We would then repeat an ultrasound to see if this also returns to normal.      Treatment of other medical problems in patients with chronic liver disease  The patient was directed to continue all current medications at the current dosages. There are no contraindications for the patient to take any medications that are necessary for treatment of other medical issues. The patient can take oral diabetic agents for treatment of diabetes and statins for treatment of hypercholesterolemia. This will not impact the patient's current liver disease. The patient does not have cirrhosis. Normal doses of NSAIDs can be used for pain as needed. Counseling for alcohol in patients with chronic liver disease  The patient was counseled regarding alcohol consumption and the effect of alcohol on chronic liver disease. The patient was reminded that alcohol can cause fatty liver. Vaccinations   Vaccination for viral hepatitis A and B is recommended since the patient has no serologic evidence of previous exposure or vaccination with immunity. Routine vaccinations against other bacterial and viral agents can be performed as indicated. Annual flu vaccination should be administered if indicated. ALLERGIES  Allergies   Allergen Reactions    Stadol [Butorphanol Tartrate] Hives    Sulfa (Sulfonamide Antibiotics) Hives       MEDICATIONS  Current Outpatient Medications   Medication Sig    escitalopram oxalate (LEXAPRO) 20 mg tablet 20 mg.    ibuprofen (MOTRIN) 200 mg tablet 200 mg.      No current facility-administered medications for this visit. SYSTEM REVIEW NOT RELATED TO LIVER DISEASE OR REVIEWED ABOVE:  Constitution systems: Negative for fever, chills, weight gain, weight loss. Eyes: Negative for visual changes. ENT: Negative for sore throat, painful swallowing. Respiratory: Negative for cough, hemoptysis, SOB. Cardiology: Negative for chest pain, palpitations. GI:  Negative for constipation or diarrhea. : Negative for urinary frequency, dysuria, hematuria, nocturia. Skin: Negative for rash. Hematology: Negative for easy bruising, blood clots. Musculo-skelatal: Negative for back pain, muscle pain, weakness. Neurologic: Negative for headaches, dizziness, vertigo, memory problems not related to HE. Psychology: Negative for anxiety, depression. FAMILY HISTORY:  The father  of GB rupture. The mother  of CHF. There is no family history of liver disease. SOCIAL HISTORY:  The patient is . The patient has 2 children  The patient has never used tobacco products. The patient consumes 2-3 alcoholic beverages per weekend. The patient currently works full time title research. PHYSICAL EXAMINATION:  There were no vitals taken for this visit. General: No acute distress. Eyes: Sclera anicteric. ENT: No oral lesions. Nodes: No adenopathy. Skin: No spider angiomata. No jaundice. No palmar erythema. Respiratory: No wheezing, respiratory distress, cyanosis. Cardiovascular: No JVD. Abdomen: Appears soft with no obvious ascites. Extremities: No edema. No muscle wasting. No gross arthritic changes. Neurologic: Alert and oriented. Cranial nerves grossly intact. No asterixis.       LABORATORY STUDIES:  From 3/2020  AST/ALT/ALP/T Bili/ALB: 33/47/114/0.4/4.2  WBC/HB/PLT: 5.7/13/226  NA/CREAT:143/0.90    Liver [place] [address] Units 3/18/2019 10/22/2018   WBC 4.6 - 13.2 K/uL  6.3   ANC 1.8 - 8.0 K/UL  3.3   HGB 12.0 - 16.0 g/dL  13.7    - 420 K/uL  232   AST 15 - 37 U/L 31 28   ALT 13 - 56 U/L 42 43   Alk Phos 45 - 117 U/L 112 132 (H)   Bili, Total 0.2 - 1.0 MG/DL 0.4 0.3   Bili, Direct 0.0 - 0.2 MG/DL 0.1 0.1   Albumin 3.4 - 5.0 g/dL 3.9 4.2   BUN 7.0 - 18 MG/DL  18   Creat 0.6 - 1.3 MG/DL  0.92   Na 136 - 145 mmol/L  139   K 3.5 - 5.5 mmol/L  4.4   Cl 100 - 108 mmol/L  103   CO2 21 - 32 mmol/L  31   Glucose 74 - 99 mg/dL  79     SEROLOGIES:  Serologies Latest Ref Rng 8/18/2016   Hep A Ab, Total NEGATIVE   NEGATIVE   Hep B Surface Ag <1.00 Index <0.10   Hep B Surface Ag Interp NEG   NEGATIVE   Hep B Core Ab, Total NEGATIVE   NEGATIVE   Hep B Surface Ab >10.0 mIU/mL <3.10 (L)   Hep B Surface Ab Interp POS   NEGATIVE (A)   Hep C Ab 0.0 - 0.9 s/co ratio <0.1   Ferritin 8 - 388 NG/ML 77   Iron % Saturation % 26   JOCY, IFA  NEGATIVE   ASMCA 0 - 19 Units 15   Ceruloplasmin 19.0 - 39.0 mg/dL 26.1   Alpha-1 antitrypsin level 90 - 200 mg/dL 91     LIVER HISTOLOGY:  9/2016. Ultrasound shear wave elastography was performed at [facility]. E Range: 4.60-6.55 kPa. E Mean: 5.52 kPa. E Median: 5.66 kPa. E Std: 0.62 kPa  The results suggested a fibrosis level of F1.  10/2016. Slides reviewed by MLS. CARMONA.  33-66% macro and micovesicular steatosis. Mild ballooning. Mild inflammation. Portal fibrosis. DINORA score 5 (211). 5/2018. Shear wave elastography performed at [facility]. EkPa was E Range: 7.34-11.68 kPa, E Mean: 9.60 kPa, E Median: 9.73 kPa, E Std: 1.50 kPa. The results suggested a fibrosis level of F3.  9/2019. Shear wave elastography performed at [facility]. EkPa was E Range: 4-6.7 kPa, E Mean: 5.1 kPa, E Median: 4.9 kPa,   E Std: 0.95 kPa. The results suggested a fibrosis level of F0. ENDOSCOPIC PROCEDURES:  Not available or performed     RADIOLOGY:  4/2016. Ultrasound of liver. Echogenic consistent with chronic liver disease or fatty lvier. No liver mass lesions. No dilated bile ducts. No ascites. 9/2016. Ultrasound of liver.  Echogenic consistent with chronic liver disease. No liver mass lesions. No dilated bile ducts. No ascites. 5/2018. Ultrasound of liver. Echogenic consistent with fatty liver. No liver mass lesions. No dilated bile ducts. No ascites. 9/2019. Ultrasound of liver. Echogenic consistent with chronic liver disease. No liver mass lesions. No dilated bile ducts. No ascites. OTHER TESTING:  Not available or performed    FOLLOW-UP:  Pursuant to the emergency declaration under the [address] [place] [address] waiver authority and the [employer] Act, this Virtual  Visit was conducted, with the patient's (and/or their legal guardian's) consent, to reduce the patient's risk of exposure to COVID-19 and provide necessary medical care. Services were provided through a video synchronous discussion virtually to substitute for an in-person clinic visit. The patient was located in their home. The provider was located in the [place] [address] office. Because of the COVID-19 epidemic all non-emergent diagnostic testing and the in-office visit will be delayed by several months to reduce the risk of patient exposure to and potential infection from the novel corona virus. This follow-up appointment may be delayed further if warranted by the status of the epidemic at that time. All of the above issues were discussed with the patient. All questions were answered. The patient expressed a clear understanding of the above. [facility] 87 in 6 months for routine monitoring. Fibroscan at next visit.        CESAR Jeffers-BC  [address] [place] [address], [address] [place] [address], [address] [place] [address] Street - [address] [phone]